# Patient Record
Sex: FEMALE | Race: WHITE
[De-identification: names, ages, dates, MRNs, and addresses within clinical notes are randomized per-mention and may not be internally consistent; named-entity substitution may affect disease eponyms.]

---

## 2019-02-18 ENCOUNTER — HOSPITAL ENCOUNTER (INPATIENT)
Dept: HOSPITAL 80 - FED | Age: 34
LOS: 8 days | Discharge: HOME | DRG: 477 | End: 2019-02-26
Attending: INTERNAL MEDICINE | Admitting: INTERNAL MEDICINE
Payer: COMMERCIAL

## 2019-02-18 DIAGNOSIS — L03.312: ICD-10-CM

## 2019-02-18 DIAGNOSIS — M60.88: ICD-10-CM

## 2019-02-18 DIAGNOSIS — M46.47: ICD-10-CM

## 2019-02-18 DIAGNOSIS — M46.27: ICD-10-CM

## 2019-02-18 DIAGNOSIS — D50.9: ICD-10-CM

## 2019-02-18 DIAGNOSIS — M62.830: ICD-10-CM

## 2019-02-18 DIAGNOSIS — G06.1: ICD-10-CM

## 2019-02-18 DIAGNOSIS — M46.46: ICD-10-CM

## 2019-02-18 DIAGNOSIS — M46.26: Primary | ICD-10-CM

## 2019-02-18 LAB — PLATELET # BLD: 494 10^3/UL (ref 150–400)

## 2019-02-18 PROCEDURE — A9585 GADOBUTROL INJECTION: HCPCS

## 2019-02-18 PROCEDURE — 0QB03ZX EXCISION OF LUMBAR VERTEBRA, PERCUTANEOUS APPROACH, DIAGNOSTIC: ICD-10-PCS | Performed by: GENERAL ACUTE CARE HOSPITAL

## 2019-02-18 PROCEDURE — C1751 CATH, INF, PER/CENT/MIDLINE: HCPCS

## 2019-02-18 PROCEDURE — 0SB03ZX EXCISION OF LUMBAR VERTEBRAL JOINT, PERCUTANEOUS APPROACH, DIAGNOSTIC: ICD-10-PCS | Performed by: GENERAL ACUTE CARE HOSPITAL

## 2019-02-18 PROCEDURE — G0378 HOSPITAL OBSERVATION PER HR: HCPCS

## 2019-02-18 RX ADMIN — KETOROLAC TROMETHAMINE PRN MG: 15 INJECTION, SOLUTION INTRAMUSCULAR; INTRAVENOUS at 20:09

## 2019-02-18 RX ADMIN — OXYCODONE HYDROCHLORIDE AND ACETAMINOPHEN PRN TAB: 5; 325 TABLET ORAL at 17:27

## 2019-02-18 RX ADMIN — CYCLOBENZAPRINE HYDROCHLORIDE PRN MG: 10 TABLET, FILM COATED ORAL at 17:59

## 2019-02-18 RX ADMIN — HYDROMORPHONE HYDROCHLORIDE PRN MG: 1 INJECTION, SOLUTION INTRAMUSCULAR; INTRAVENOUS; SUBCUTANEOUS at 18:00

## 2019-02-18 NOTE — GCON
[f rep st]



                                                                    CONSULTATION





INPATIENT INFECTIOUS DISEASE CONSULTATION



REFERRING PHYSICIAN:  Eran Carney MD



REASON FOR REFERRAL:  Probable lumbar postoperative infection.



HISTORY OF PRESENT ILLNESS:  Patient is a 33-year-old female who presented to the emergency room on 0
2/18/2019 with complaints of back pain with chills and sweats.  Patient has a history of an L4-S1 dis
kectomy in [place], [place] in November of last year.  Patient suffered as a complication of dur
al leak that needed to be corrected on 12/27/2018.  This surgery occurred at [facility] in [facility].  This spinal leak was corrected; however, the patient has been having increase in lumbar back pa
in and spasms since then.  She had feelings of sweats and chills over the last number of days as well
.  She came to the emergency room today and was evaluated with a lumbar spine MRI with contrast.  The
re shows significant L4-L5 as well as L5-S1 disk edema with enhancement as well as phlegmon that enha
nces epidurally.  There is no clear drainable abscess.  We are consulted to help guide antibiotic the
rapy as well as diagnosis.  Patient is comfortably resting in her hospital bed.  She is awaiting an I
nterventional Radiology aspiration of the area.  Other than pain and discomfort in her back, she has 
no acute complaints.



PAST MEDICAL HISTORY:  Asthma.



PAST SURGICAL HISTORY:  As above.



ANTIBIOTICS:  None currently.



ALLERGIES:  Patient only notes seasonal allergies.



SOCIAL HISTORY:  Patient recently moved to [place] from [place], [place].  No significant tobacc
o, alcohol, or drug use noted.



FAMILY HISTORY:  Reviewed but noncontributory.



REVIEW OF SYSTEMS:  Other than that detailed above in history of present illness, comprehensive 10-sy
stem review is negative.



PHYSICAL EXAMINATION:  VITAL SIGNS:  Temperature maximum is 36.9, temperature current is 36.7, heart 
rate is 75, respiratory rate is 16, blood pressure is 118/86.  GENERAL:  The patient is a well-formed
, well-nourished female in no acute distress.  She is not toxic in appearance.  She is alert and orie
nted x3.  She is pleasant in demeanor.  HEENT:  Normocephalic for age.  Atraumatic.  No scleral icter
us.  No drainage from the nares.  Eyes, lids and conjunctivae are within normal limits.  Pupils are e
qual and round bilaterally.  NECK:  Supple.  No meningismus.  HEART:  Regular rate and rhythm.  No si
gnificant peripheral edema.  SKIN:  Warm and dry to the touch.  No rash or lesion noted.  NEURO:  Cra
nial nerves 2-12 seem to be intact.  Peripheral sensation seems intact in extremities.



LABORATORY DATA:  Patient has a CBC dated 02/18/2019 that shows a white blood cell count of 12.02, he
moglobin of 12.1, hematocrit of 38.2, and a platelet count of 494.  Differential is within normal hines
its.  Serum chemistries show sodium 140, potassium 3.8, chloride of 103, bicarbonate of 23, BUN of 7,
 and creatinine of 0.6.  C-reactive protein is elevated at 16.2.



MICROBIOLOGIC DATA:  Patient has blood cultures dated 02/18/2019 which are pending.



RADIOLOGIC DATA:  Patient has a lumbar spine MRI dated 02/18/2019, which shows L4-L5 as well as L5-S1
 disk edema with enhancement as well as adjacent vertebral body edema enhancement with a right L4-L5 
and L5-S1 ventral epidural enhancing tissue suspicious for diskitis as well as vertebral osteomyeliti
s.  She also has an enhancing ventral epidural phlegmon.  There are no drainable abscesses seen.



ASSESSMENT:  Probable postoperative infection in the lower lumbar area following diskectomy as well a
s dural leak repair.  We will get an aspiration of the soft tissue for culture and afterwards empiric
ally start vancomycin 1.25 g IV q.12 hours.  We will follow up on the results of the aspiration and f
ollow up on vancomycin levels after 3 doses.



PLAN:  

1.  Aspiration of the disk space in the affected lumbar areas.

2.  Empiric start of vancomycin 1.25 g q.12 hours following sampling.

3.  Follow up on results and clinical course.





Job #:  [id number]

## 2019-02-18 NOTE — EDPHY
H & P


Time Seen by Provider: 02/18/19 10:33


Constitutional: 


 Initial Vital Signs











Temperature (C)  36.7 C   02/18/19 10:42


 


Heart Rate  77   02/18/19 10:42


 


Respiratory Rate  18   02/18/19 10:42


 


Blood Pressure  125/88 H  02/18/19 10:42


 


O2 Sat (%)  97   02/18/19 10:42








 











O2 Delivery Mode               Nasal Cannula


 


O2 (L/minute)                  2














Allergies/Adverse Reactions: 


 





No Known Allergies Allergy (Verified 02/18/19 14:08)


 








Home Medications: 














 Medication  Instructions  Recorded


 


Advil  02/18/19


 


Flexeril 10 MG (*)  02/18/19


 


Ibuprofen [Motrin (*)] 600 mg PO DAILY18 02/18/19


 


Naproxen Sodium [Aleve 220 MG (*)] 220 mg PO BID PRN 02/18/19


 


Valium 2 MG (*)  02/18/19














Medical Decision Making





- Diagnostics


Imaging Results: 


 Imaging Impressions





Lumbar Spine MRI  02/18/19 10:54


Impression:


1. L4-L5 and L5-S1 disk edema with enhancement as well as adjacent vertebral 

body edema and enhancement with right L4-L5 and L5-S1 ventral epidural 

enhancing tissue suspicious for diskitis, osteomyelitis, enhancing ventral 

epidural phlegmon versus less likely severe postsurgical changes with enhancing 

postsurgical gliosis/scarring.


2. No drainable abscess.


3. Please see above findings at specific levels.


 


Findings and recommendations discussed with Emergency Department physician, 

Eran Carney MD, at 1250 hour, 2/18/2019.


 


Final report concurs with initial preliminary interpretation. 











Imaging: Discussed imaging studies w/ On call Radiologist, I viewed and 

interpreted images myself


ED Course/Re-evaluation: 





CHIEF COMPLAINT:  Back pain





HISTORY OF PRESENT ILLNESS:  


The patient is a 34 y/o female with a history of L4-S1 discectomy and dura leak 

arriving via EMS complaining of acute onset back pain. On 11/27/18 she had an L4

-L5/L5-S1 discectomy in [place]; she did not have a fusion. On 12/27/18 she had 

surgery to correct a dura leak at [facility] with Dr. Chavarria in [place]. 

This resolved the spinal leak and she did not have any acute symptoms after the 

surgery. Two weeks ago while working out on a [employer] bicycle she developed a 

similar episode of back pain and spasms. She went back to [facility] for the 

spasm, had an MRI, and was thought that the symptoms were due to and SI joint 

problem due to a lateral shift from the herniated discs. Several days ago she 

went to PT and had more extensive manipulation than normal. She was sore after 

the PT visit but was able to walk without difficulty. After going to her 

chiropractor today she was getting into a truck when her back started severely 

spasm today. She took an unknown amount of Valium at 09:45, 1 hour ago, which 

did not alleviate the symptoms. EMS was called and they gave 100mcg IM Fentanyl 

which did not help her symptoms. Currently the pain feels like it is radiating 

up her back and not down her legs. Due to the pain she is having difficulty 

walking. She denies recent injections or instrumentations in her back since her 

last surgery. No fever, headache, body aches, lightheadedness, chest pain, 

heart palpitations, shortness of breath, cough, abdominal pain, urinary or 

bowel complaints.





REVIEW OF SYSTEMS:  





A comprehensive 10 system review of systems is otherwise negative aside from 

elements mentioned in the history of present illness and medical decision 

making.





PHYSICAL EXAM:  





HR, BP, O2 Sat, RR.  Temp noted


General Appearance:  Alert, well hydrated, appropriate, and non-toxic appearing.


Head:  Atraumatic without scalp tenderness or obvious injury


Eyes:  Pupils equal, round, reactive to light and accommodation, EOMI, no trauma

, no injection.


Ears:  Clear bilaterally, no perforation, normal landmarks


Nose:  Atraumatic, no rhinorrhea, clear.


Throat:  There is no erythema or exudates, no lesions, normal tonsils, mucus 

membranes moist.


Neck:  Supple, 2+ carotid upstroke, nontender, no lymphadenopathy.


Respiratory:  No retractions, no distress, no wheezes, and no accessory muscle 

use.  Lungs are clear to auscultation bilaterally.


Cardiovascular:  Regular rate and rhythm, no murmurs, rubs, or gallops. 

Bilateral carotid, radial, dorsalis pedis, and posterior tibial pulses intact. 

Good capillary refill all extremities.


Gastrointestinal:  Abdomen is soft, nontender, non-distended, no masses, no 

rebound, no guarding, no peritoneal signs.


Back: non-intractable low back pain radiating up. She does not have any 

radiculopathy.


Musculoskeletal:  Normal active ROM of all extremities, atraumatic.


Neurological:  Alert, appropriate, and interactive.  The patient has normal 

DTRs and non-focal cranial nerves, motor, sensory, and cerebellar exam.


Skin:  No rashes, good turgor, no nodules on palpation.





Past medical history: Asthma


Past surgical history:  L4-S1 discectomy (11/27/18)  and dura leak repair (12/27 /18)


Family history: Denies


Social history: Family at [facility], recently moved to [place] from [place], employed





DIAGNOSTICS/PROCEDURES/CRITICAL CARE TIME:  





Lumbar spine MRI:  L4-L5 and L5-S1 disk edema with enhancement as well as 

adjacent vertebral body edema and enhancement with right L4-L5 and L5-S1 

ventral epidural enhancing tissue suspicious for diskitis, osteomyelitis, 

enhancing ventral epidural phlegmon versus less likely severe postsurgical 

changes with enhancing postsurgical gliosis/scarring. No drainable abscess.





Critical care time spent by me, Dr. Carney, exclusively with this patient was 

90 minutes, exclusive of PA time and exclusive of procedures.  The organ system 

at risk was neurovascular and I gave IV meds, antibiotics and transferred the 

patient to a neurosurgeon to prevent  worsening of the patients condition.








DIFFERENTIAL DIAGNOSIS:   


The differential diagnosis for the patient's back pain included but was not 

limited to musculoskeletal pain, epidural abscess, herniated disk, spinal 

fracture, and intra-abdominal causes including urinary system.





MEDICAL DECISION MAKING:  


The patient is a 34 y/o female with a history of L4-S1 discectomy (11/27/18)  

and dura leak repair (12/27/18) arriving via EMS presenting with acute onset 

back pain. On exam she has non-intractable low back pain radiating up. She does 

not have any radiculopathy. Lumbar spine MRI and labs ordered; 1mg IV Dilaudid, 

1mg IV Ativan, and 30mg IV Toradol administered.





1034: I met EMS upon arrival.





1254: I spoke with Dr. Malcolm, radiologist, regarding patient's lumbar spine 

MRI. This patient might have a discitis and will need to have a neurology 

consult. Additional labs ordered.





1259: I consulted with Dr. Wilkerson, neurosurgeon, regarding this patient. His 

practice will come into the emergency department to consult on this patient.





1300: Reassessed patient and discussed imaging findings. I have also discussed 

plan for neurosurgeon consult, which she is comfortable with.





1325: I consulted with Dr. Teresa neurosurgeon, regarding this patient as he is 

in the emergency department. Patient does have a mildly elevated WBC. 

Additional labs ordered.





1339: Dr. Grimm believes that this patient has an infection and will need an I&

D consult, which I have paged. This patient is also now endorsing chills. The 

patient is comfortable with plan for admission and possible surgery.





1341: I consulted with the hospitalist service, Dr. Herr accepts admission 

of this patient. Patient will receive and additional 1mg IV Dilaudid.





1357: I consulted with Dr. Mauricio, ID, regarding this patient. I will also 

consult with interventional radiology to discuss if we can express and purulent 

from the area of infection. Patient will need to have vancomycin administered 

after the phlegmon cultures are collected.





1405: I consulted with Dr. De Los Santos, interventional radiologist, regarding this 

patient. He will aspirate the lumbar phlegmon.





1407: Reassessed patient and discussed consult with ID and IR. Patient is 

comfortable with plan for IR phlegmon aspiration.





1446: I consulted with Dr. Santo, ID, as he is now in the emergency department. 

Patient is still awaiting phlegmon aspiration at shift change.





1500: Patient is waiting to be transferred to the floor at shift change. We are 

waiting to give patient the vancomycin until after the IR procedure.








- Data Points


Laboratory Results: 


 Laboratory Results





 02/18/19 12:45 





 02/18/19 10:46 





 











  02/18/19 02/18/19 02/18/19





  12:45 12:45 11:03


 


WBC      





    


 


RBC      





    


 


Hgb      





    


 


POC Hgb      12.9 gm/dL gm/dL





     (12.6-16.3) 


 


Hct  38.9 % %    





   (38.0-47.0)   


 


POC Hct      38 % %





     (38-47) 


 


MCV      





    


 


MCH      





    


 


MCHC      





    


 


RDW      





    


 


Plt Count      





    


 


MPV      





    


 


Neut % (Auto)      





    


 


Lymph % (Auto)      





    


 


Mono % (Auto)      





    


 


Eos % (Auto)      





    


 


Baso % (Auto)      





    


 


Nucleat RBC Rel Count      





    


 


Absolute Neuts (auto)      





    


 


Absolute Lymphs (auto)      





    


 


Absolute Monos (auto)      





    


 


Absolute Eos (auto)      





    


 


Absolute Basos (auto)      





    


 


Absolute Nucleated RBC      





    


 


Immature Gran %      





    


 


Immature Gran #      





    


 


ESR  20 MM/HR MM/HR    





   (0-20)   


 


POC Sodium      140 mEq/L mEq/L





     (135-145) 


 


Sodium      





    


 


POC Potassium      3.8 mEq/L mEq/L





     (3.3-5.0) 


 


Potassium      





    


 


POC Chloride      103 mEq/L mEq/L





     () 


 


Chloride      





    


 


Carbon Dioxide      





    


 


POC Total CO2      23 mEq/L mEq/L





     (22-31) 


 


Anion Gap      





    


 


POC BUN      7 mg/dL mg/dL





     (7-23) 


 


BUN      





    


 


Creatinine      





    


 


POC Creatinine      0.6 mg/dL mg/dL





     (0.6-1.0) 


 


Estimated GFR      





    


 


Glucose      





    


 


POC Glucose      85 mg/dL mg/dL





     () 


 


Calcium      





    


 


C-Reactive Protein    16.2 mg/L H mg/L  





    (<10.0)  














  02/18/19 02/18/19





  10:46 10:46


 


WBC    12.02 10^3/uL H 10^3/uL





    (3.80-9.50) 


 


RBC    4.17 10^6/uL L 10^6/uL





    (4.18-5.33) 


 


Hgb    12.1 g/dL L g/dL





    (12.6-16.3) 


 


POC Hgb    





   


 


Hct    38.2 % %





    (38.0-47.0) 


 


POC Hct    





   


 


MCV    91.6 fL fL





    (81.5-99.8) 


 


MCH    29.0 pg pg





    (27.9-34.1) 


 


MCHC    31.7 g/dL L g/dL





    (32.4-36.7) 


 


RDW    12.8 % %





    (11.5-15.2) 


 


Plt Count    494 10^3/uL H 10^3/uL





    (150-400) 


 


MPV    9.2 fL fL





    (8.7-11.7) 


 


Neut % (Auto)    73.6 % %





    (39.3-74.2) 


 


Lymph % (Auto)    19.2 % %





    (15.0-45.0) 


 


Mono % (Auto)    4.1 % L %





    (4.5-13.0) 


 


Eos % (Auto)    2.1 % %





    (0.6-7.6) 


 


Baso % (Auto)    0.6 % %





    (0.3-1.7) 


 


Nucleat RBC Rel Count    0.0 % %





    (0.0-0.2) 


 


Absolute Neuts (auto)    8.85 10^3/uL H 10^3/uL





    (1.70-6.50) 


 


Absolute Lymphs (auto)    2.31 10^3/uL 10^3/uL





    (1.00-3.00) 


 


Absolute Monos (auto)    0.49 10^3/uL 10^3/uL





    (0.30-0.80) 


 


Absolute Eos (auto)    0.25 10^3/uL 10^3/uL





    (0.03-0.40) 


 


Absolute Basos (auto)    0.07 10^3/uL 10^3/uL





    (0.02-0.10) 


 


Absolute Nucleated RBC    0.00 10^3/uL 10^3/uL





    (0-0.01) 


 


Immature Gran %    0.4 % %





    (0.0-1.1) 


 


Immature Gran #    0.05 10^3/uL 10^3/uL





    (0.00-0.10) 


 


ESR    





   


 


POC Sodium    





   


 


Sodium  138 mEq/L mEq/L  





   (135-145)  


 


POC Potassium    





   


 


Potassium  4.2 mEq/L mEq/L  





   (3.5-5.2)  


 


POC Chloride    





   


 


Chloride  106 mEq/L mEq/L  





   ()  


 


Carbon Dioxide  22 mEq/l mEq/l  





   (22-31)  


 


POC Total CO2    





   


 


Anion Gap  10 mEq/L mEq/L  





   (6-14)  


 


POC BUN    





   


 


BUN  10 mg/dL mg/dL  





   (7-23)  


 


Creatinine  0.6 mg/dL mg/dL  





   (0.6-1.0)  


 


POC Creatinine    





   


 


Estimated GFR  > 60   





   


 


Glucose  84 mg/dL mg/dL  





   ()  


 


POC Glucose    





   


 


Calcium  9.6 mg/dL mg/dL  





   (8.5-10.4)  


 


C-Reactive Protein    





   











Medications Given: 


 








Discontinued Medications





Hydromorphone HCl (Dilaudid)  1 mg IVP EDNOW ONE


   Stop: 02/18/19 10:55


   Last Admin: 02/18/19 11:21 Dose:  1 mg


Hydromorphone HCl (Dilaudid)  1 mg IVP EDNOW ONE


   Stop: 02/18/19 13:52


   Last Admin: 02/18/19 13:55 Dose:  1 mg


Ketorolac Tromethamine (Toradol)  30 mg IVP EDNOW ONE


   Stop: 02/18/19 10:55


   Last Admin: 02/18/19 11:20 Dose:  30 mg


Lorazepam (Ativan Injection)  1 mg IVP EDNOW ONE


   Stop: 02/18/19 10:55


   Last Admin: 02/18/19 11:20 Dose:  1 mg





Point of Care Test Results: 


 Chemistry











  02/18/19





  11:03


 


POC Sodium  140 mEq/L mEq/L





   (135-145) 


 


POC Potassium  3.8 mEq/L mEq/L





   (3.3-5.0) 


 


POC Chloride  103 mEq/L mEq/L





   () 


 


POC Total CO2  23 mEq/L mEq/L





   (22-31) 


 


POC BUN  7 mg/dL mg/dL





   (7-23) 


 


POC Creatinine  0.6 mg/dL mg/dL





   (0.6-1.0) 


 


POC Glucose  85 mg/dL mg/dL





   () 








 ISTAT H&H











  02/18/19





  11:03


 


POC Hgb  12.9 gm/dL gm/dL





   (12.6-16.3) 


 


POC Hct  38 % %





   (38-47) 














Departure





- Departure


Disposition: [facility] Inpatient Acute


Clinical Impression: 


 Phlegmon





Discitis


Qualifiers:


 Spinal region: lumbosacral Qualified Code(s): M46.47 - Discitis, unspecified, 

lumbosacral region





Condition: Good


Referrals: 


Patient,NotPresent [Unknown] - As per Instructions


Report Scribed for: Eran Carney


Report Scribed by: Maria A Berrios


Date of Report: 02/18/19


Time of Report: 11:08

## 2019-02-18 NOTE — PDGENHP
History and Physical





- Chief Complaint


back pain





- History of Present Illness


32yo F with history of L4-5/L5-S1 herniation s/p discectomy (11/27/2018 in 

[place]) complicated by dural leak s/p patch (12/27/2018 at [facility]) presents 

with acute onset back pain. First noticed some mild low back pain about 2 weeks 

ago after exercising and while working with PT. Went back to [facility] and had 

MRI at that time that was reportedly unremarkable. She was about to go to her 

chiropractor today when she developed very severe mid-line low back pain 

associated with L>R leg weakness and decided to come to the ED. She reports 1-2 

days of chills but no fevers. No loss of bowel/bladder continence. In the ED, 

lumbar MRI is concerning for discitis/osteomyelitis with possible phlegmon at L4

-5/L5-S1. Neurosurgery, IR, and ID were all consulted in the ED.





History Information





- Allergies/Home Medication List


Allergies/Adverse Reactions: 








No Known Allergies Allergy (Verified 02/18/19 14:08)


 





Home Medications: 








Cyclobenzaprine [Flexeril 10 MG (*)] 5 mg PO BID 02/18/19 [Last Taken 02/18/19 

08:30]


Diazepam [Valium 2 MG (*)] 2 mg PO TID PRN 02/18/19 [Last Taken 02/18/19 09:30]


Ibuprofen [Motrin (*)] 400 mg PO BID@09,12 02/18/19 [Last Taken 02/18/19 09:00]


Ibuprofen [Motrin (*)] 600 mg PO DAILY18 02/18/19 [Last Taken 02/17/19]


Naproxen Sodium [Aleve 220 MG (*)] 220 mg PO BID PRN 02/18/19 [Last Taken 

Unknown]





I have personally reviewed and updated: family history, medical history, social 

history, surgical history





- Past Medical History


Additional medical history: as per HPI





- Surgical History


Additional surgical history: as per HPI





- Family History


Additional family history: father - currently undergoing spinal decompression 

surgery





- Social History


Smoking Status: Never smoked


Alcohol Use: Rarely


Drug Use: None


Additional social history: Lives with 





Review of Systems


Review of Systems: 





ROS: 10pt was reviewed & negative except for what was stated in HPI & below





Physical Exam


Physical Exam: 

















Temp Pulse Resp BP Pulse Ox


 


 36.7 C   79   16   118/68   98 


 


 02/18/19 13:00  02/18/19 13:00  02/18/19 13:00  02/18/19 13:00  02/18/19 13:00











Constitutional: no apparent distress, appears nourished, not in pain, other (

lying flat on back)


Eyes: PERRL, anicteric sclera, EOMI


Ears, Nose, Mouth, Throat: moist mucous membranes, hearing normal, ears appear 

normal, no oral mucosal ulcers


Cardiovascular: regular rate and rhythym, no murmur, rub, or gallop, No edema


Respiratory: no respiratory distress, no rales or rhonchi, clear to auscultation


Gastrointestinal: normoactive bowel sounds, soft, non-tender abdomen, no 

palpable masses


Genitourinary: no bladder fullness, no bladder tenderness


Skin: warm, normal color, no rashes or abrasions, no fluctuance, no induration, 

No mottled


Musculoskeletal: other (difficult to assess strength in BLE due to pain)


Neurologic: AAOx3, sensation intact bilaterally, CN II-XII Intact, other (on 

ankle clonus or hyperreflexia at knees)


Psychiatric: interacting appropriately





Lab Data & Imaging Review





 02/18/19 12:45





 02/18/19 10:46














WBC  12.02 10^3/uL (3.80-9.50)  H  02/18/19  10:46    


 


RBC  4.17 10^6/uL (4.18-5.33)  L  02/18/19  10:46    


 


Hgb  12.1 g/dL (12.6-16.3)  L  02/18/19  10:46    


 


POC Hgb  12.9 gm/dL (12.6-16.3)   02/18/19  11:03    


 


Hct  38.9 % (38.0-47.0)   02/18/19  12:45    


 


POC Hct  38 % (38-47)   02/18/19  11:03    


 


MCV  91.6 fL (81.5-99.8)   02/18/19  10:46    


 


MCH  29.0 pg (27.9-34.1)   02/18/19  10:46    


 


MCHC  31.7 g/dL (32.4-36.7)  L  02/18/19  10:46    


 


RDW  12.8 % (11.5-15.2)   02/18/19  10:46    


 


Plt Count  494 10^3/uL (150-400)  H  02/18/19  10:46    


 


MPV  9.2 fL (8.7-11.7)   02/18/19  10:46    


 


Neut % (Auto)  73.6 % (39.3-74.2)   02/18/19  10:46    


 


Lymph % (Auto)  19.2 % (15.0-45.0)   02/18/19  10:46    


 


Mono % (Auto)  4.1 % (4.5-13.0)  L  02/18/19  10:46    


 


Eos % (Auto)  2.1 % (0.6-7.6)   02/18/19  10:46    


 


Baso % (Auto)  0.6 % (0.3-1.7)   02/18/19  10:46    


 


Nucleat RBC Rel Count  0.0 % (0.0-0.2)   02/18/19  10:46    


 


Absolute Neuts (auto)  8.85 10^3/uL (1.70-6.50)  H  02/18/19  10:46    


 


Absolute Lymphs (auto)  2.31 10^3/uL (1.00-3.00)   02/18/19  10:46    


 


Absolute Monos (auto)  0.49 10^3/uL (0.30-0.80)   02/18/19  10:46    


 


Absolute Eos (auto)  0.25 10^3/uL (0.03-0.40)   02/18/19  10:46    


 


Absolute Basos (auto)  0.07 10^3/uL (0.02-0.10)   02/18/19  10:46    


 


Absolute Nucleated RBC  0.00 10^3/uL (0-0.01)   02/18/19  10:46    


 


Immature Gran %  0.4 % (0.0-1.1)   02/18/19  10:46    


 


Immature Gran #  0.05 10^3/uL (0.00-0.10)   02/18/19  10:46    


 


ESR  20 MM/HR (0-20)   02/18/19  12:45    


 


POC Sodium  140 mEq/L (135-145)   02/18/19  11:03    


 


Sodium  138 mEq/L (135-145)   02/18/19  10:46    


 


POC Potassium  3.8 mEq/L (3.3-5.0)   02/18/19  11:03    


 


Potassium  4.2 mEq/L (3.5-5.2)   02/18/19  10:46    


 


POC Chloride  103 mEq/L ()   02/18/19  11:03    


 


Chloride  106 mEq/L ()   02/18/19  10:46    


 


Carbon Dioxide  22 mEq/l (22-31)   02/18/19  10:46    


 


POC Total CO2  23 mEq/L (22-31)   02/18/19  11:03    


 


Anion Gap  10 mEq/L (6-14)   02/18/19  10:46    


 


POC BUN  7 mg/dL (7-23)   02/18/19  11:03    


 


BUN  10 mg/dL (7-23)   02/18/19  10:46    


 


Creatinine  0.6 mg/dL (0.6-1.0)   02/18/19  10:46    


 


POC Creatinine  0.6 mg/dL (0.6-1.0)   02/18/19  11:03    


 


Estimated GFR  > 60   02/18/19  10:46    


 


Glucose  84 mg/dL ()   02/18/19  10:46    


 


POC Glucose  85 mg/dL ()   02/18/19  11:03    


 


Calcium  9.6 mg/dL (8.5-10.4)   02/18/19  10:46    


 


C-Reactive Protein  16.2 mg/L (<10.0)  H  02/18/19  12:45    











Assessment & Plan


Assessment: 


32yo F with history of L4-5/L5-S1 herniation s/p discectomy (11/27/2018 in 

[place]) complicated by dural leak s/p patch (12/27/2018 at [facility]) presents 

with acute onset back pain. MRI concerning for discitis. 


Plan: 





1. Concern for L4-5/L5-S1 discitis and phlegmon: She is not septic/toxic 

appearing. 


   - IR consulted for phlegmon aspiration. Send for gram stain/culture of 

aspirated material


   - ID consulted. Will likely need IV vancomycin once aspiration complete


   - Blood cultures ordered


   - Neurosurgery consulted for potential need for debridement/washout


   - Will make oxycodone, IV dilaudid and ativan available PRN





2. Anemia: Normocytic. Suspect related to chronic inflammation. 


   - Check iron stores.





VTE ppx: SCDs


Code: full


Diet: NPO for now


Dispo: Admit under observation

## 2019-02-18 NOTE — GCON
[f rep st]



                                                                    CONSULTATION





NEUROSURGICAL CONSULTATION



DATE OF CONSULTATION:  02/18/2019



REASON FOR CONSULTATION:  Severe axial low back pain, possible infection.



HISTORY OF PRESENT ILLNESS:  The patient is a 33-year-old who underwent a 2-level lumbar laminectomy 
in the [employer] [facility] [employer] back in November, I believe on November 27th, complicated by delayed CSF leak, and
 she underwent subsequent surgery 1 month later for repair of the CSF leak at [facility].  At the
 time, she was having transdermal leakage of CSF out of the incision, and she was repaired by my part
ner, Dr. Donis Small, without complication.  A few weeks ago, she began to develop increasing onset 
of low back pain and had an MRI over at [facility] that demonstrated postoperative harkins
es, but then today she began to develop worsening, very severe pain in the lower back as she was abou
t to go to the chiropractor.  She was having no radiating pain into her legs, no weakness in her legs
, no numbness or tingling in the legs, just exquisitely horrible low back pain.  On further questioni
ng, she does mention that she has been having some chills, particularly nighttime chills over the las
t several days, and this was a new development for her that she was not having back in December when 
she had the repair.  She denies any actual fevers.



ALLERGIES:  None.



PAST MEDICAL HISTORY:  Includes simply low back surgery complicated by CSF leak.



SOCIAL HISTORY:  She does not smoke.  She almost never uses alcohol.  She does live with her .
  They have been staying locally because of the problems in her low back.  She does have family membe
rs here in town locally.



PHYSICAL EXAM:  Her temperature is 36.7, pulse 79, respirations 16, blood pressure 118/60, pulse ox i
s 98%.  She has normal strength in her tibialis anterior, plantar flexors and normal sensation in her
 legs.  She had a positive straight leg raise on the right.  It caused exquisitely terrible right-audi
ed low back pain.  She had more tolerance for movement of the left leg on physical exam.  Her incisio
n demonstrates diffuse erythema, but did not appear to be frankly infected.  There is no drainage fro
m the surgical site.  



MRI of the brain demonstrates diffuse contrast enhancement of the L4, L5, and S1 vertebral bodies and
 disk spaces as well as epidural space in the region consistent with possible infection.



DIAGNOSTIC REVIEW:  Her white blood cell count was noted to be 12.0 with 73.6% neutrophils.  The uppe
r limit of normal in this hospital is 9.5 for white blood cell count.  Her sed rate is 20, which is a
t the upper range of what is considered normal in this hospital.  Her C-reactive protein is 16.2, whi
ch was elevated above the normal range of less than 10.



ASSESSMENT:  The patient is a 33-year-old with really exquisite low back pain and imaging characteris
tics consistent with possible postoperative diskitis, but no evidence of true epidural abscess in thi
s case.  She has no radiating pain into her legs.  She will be admitted to the medical service and un
dergo blood cultures.  We have asked Infectious Disease to opine.  I do not see a reason to currently
 consider operative debridement of the area in the absence of neurologic findings, although we will w
ait blood cultures to see the results of these tests.  We also are deferential to our colleagues in I
nfectious Disease.  She will be admitted to the hospital and, while I saw the patient for my partner,
 Dr. Donis Small, he will likely assume her care after admission.





Job #:  [id number]

## 2019-02-18 NOTE — PDRADPN
Radiology Procedure Note


Date of Procedure: 02/18/19


Radiologist: Claudia Kendrick


Anesthesia: IV Sedation


Pre-op Diagnosis: OM + diskitis


Post-op Diagnosis: same


Procedure: L5 bone + L4-L5 disk biopsy


Inf/Abcess present in the surg proc area at time of surgery?: No

## 2019-02-19 LAB — PLATELET # BLD: 350 10^3/UL (ref 150–400)

## 2019-02-19 RX ADMIN — OXYCODONE HYDROCHLORIDE PRN MG: 15 TABLET ORAL at 17:04

## 2019-02-19 RX ADMIN — OXYCODONE HYDROCHLORIDE PRN MG: 15 TABLET ORAL at 12:42

## 2019-02-19 RX ADMIN — HYDROMORPHONE HYDROCHLORIDE PRN MG: 1 INJECTION, SOLUTION INTRAMUSCULAR; INTRAVENOUS; SUBCUTANEOUS at 03:02

## 2019-02-19 RX ADMIN — ENOXAPARIN SODIUM SCH MG: 100 INJECTION SUBCUTANEOUS at 10:36

## 2019-02-19 RX ADMIN — METHOCARBAMOL SCH MG: 750 TABLET ORAL at 07:30

## 2019-02-19 RX ADMIN — ACETAMINOPHEN PRN MG: 325 TABLET ORAL at 17:02

## 2019-02-19 RX ADMIN — KETOROLAC TROMETHAMINE PRN MG: 15 INJECTION, SOLUTION INTRAMUSCULAR; INTRAVENOUS at 17:05

## 2019-02-19 RX ADMIN — DOCUSATE SODIUM AND SENNOSIDES SCH TAB: 50; 8.6 TABLET ORAL at 10:36

## 2019-02-19 RX ADMIN — OXYCODONE HYDROCHLORIDE PRN MG: 15 TABLET ORAL at 22:34

## 2019-02-19 RX ADMIN — OXYCODONE HYDROCHLORIDE AND ACETAMINOPHEN PRN TAB: 5; 325 TABLET ORAL at 07:28

## 2019-02-19 RX ADMIN — METHOCARBAMOL SCH MG: 750 TABLET ORAL at 20:22

## 2019-02-19 RX ADMIN — HYDROMORPHONE HYDROCHLORIDE PRN MG: 1 INJECTION, SOLUTION INTRAMUSCULAR; INTRAVENOUS; SUBCUTANEOUS at 10:39

## 2019-02-19 RX ADMIN — HYDROMORPHONE HYDROCHLORIDE PRN MG: 1 INJECTION, SOLUTION INTRAMUSCULAR; INTRAVENOUS; SUBCUTANEOUS at 06:24

## 2019-02-19 RX ADMIN — HYDROMORPHONE HYDROCHLORIDE PRN MG: 1 INJECTION, SOLUTION INTRAMUSCULAR; INTRAVENOUS; SUBCUTANEOUS at 23:51

## 2019-02-19 RX ADMIN — VANCOMYCIN HYDROCHLORIDE SCH MLS: 1 INJECTION, POWDER, LYOPHILIZED, FOR SOLUTION INTRAVENOUS at 17:07

## 2019-02-19 RX ADMIN — METHOCARBAMOL SCH MG: 750 TABLET ORAL at 12:02

## 2019-02-19 RX ADMIN — DOCUSATE SODIUM AND SENNOSIDES SCH TAB: 50; 8.6 TABLET ORAL at 20:22

## 2019-02-19 RX ADMIN — CYCLOBENZAPRINE HYDROCHLORIDE PRN MG: 10 TABLET, FILM COATED ORAL at 04:37

## 2019-02-19 RX ADMIN — KETOROLAC TROMETHAMINE PRN MG: 15 INJECTION, SOLUTION INTRAMUSCULAR; INTRAVENOUS at 02:14

## 2019-02-19 RX ADMIN — DEXTROSE MONOHYDRATE SCH MLS: 5 INJECTION, SOLUTION INTRAVENOUS at 22:40

## 2019-02-19 RX ADMIN — KETOROLAC TROMETHAMINE PRN MG: 15 INJECTION, SOLUTION INTRAMUSCULAR; INTRAVENOUS at 10:47

## 2019-02-19 RX ADMIN — DEXTROSE MONOHYDRATE SCH MLS: 5 INJECTION, SOLUTION INTRAVENOUS at 15:26

## 2019-02-19 RX ADMIN — METHOCARBAMOL SCH MG: 750 TABLET ORAL at 17:05

## 2019-02-19 NOTE — PCMIDPN
Assessment/Plan: 


Assessment/Plan:


* L4-5/L5-S1 diskitis/osteomyelitis with epidural phlegmon:  Gram stain of disk 

aspirate shows gram-negative rods.  Will add cefepime given gram stain finding 

while culture is pending.  Will continue vancomycin pending culture data with 

plans to discontinue if Gram-negative rods isolated.


* Left hip pain:  Agree with plans for MRI of hip to assess for any evidence of 

joint effusion which would be concerning for possibility of septic arthritis.  

Also possible patient could have psoas or iliopsoas abscess which also could 

contribute to significant left-sided hip pain.  Will review with Radiology to 

determine if psoas visualized on MRI of lumbar spine.  If joint effusion present

, will require aspiration and orthopedic evaluation.


* Heel pain:  Unclear etiology without focal findings on exam.  Continue to 

observe over time.





Care coordinated with Dr Small, pharmacy, and microbiology lab.  Clinical 

findings and plan were reviewed with patient and .





02/19/19 16:06





02/19/19 16:14





Subjective: 





Patient complains of left hip pain.  Able to bear weight but with significant 

pain.  Worse with range of motion.  Persistent low back pain.  Patient status 

post disc aspiration by interventional Radiology yesterday.


Objective: 


 Vital Signs











Temp Pulse Resp BP Pulse Ox


 


 36.8 C   81   16   111/74   96 


 


 02/19/19 15:23  02/19/19 15:23  02/19/19 15:23  02/19/19 15:23  02/19/19 15:23








 Microbiology











 02/18/19 16:35 Gram Stain - Final





 Back - Bone 








 Laboratory Results





 02/19/19 04:35 





 











 02/18/19 02/19/19 02/20/19





 05:59 05:59 05:59


 


Intake Total  1050 


 


Output Total  1300 800


 


Balance  -250 -800








 











ESR  20 MM/HR (0-20)   02/18/19  12:45    


 


C-Reactive Protein  16.2 mg/L (<10.0)  H  02/18/19  12:45    








Vancomycin 1 g x1


Disc aspirate no white blood cells, rare gram-negative rods


Blood cultures x2 no growth


MRI of back findings reviewed


 Laboratory Tests











  02/18/19





  12:45


 


C-Reactive Protein  16.2 H














- Physical Exam


General Appearance: alert, non-toxic


EENT: No scleral icterus, No thrush, No conjunctival petechiae


Respiratory: lungs clear, No respiratory distress


Cardiac/Chest: regular rate, rhythm, No systolic murmur


Extremities: other (Tender over left hip joint and exquisitely tender with 

range of motion; bilateral heels nontender to palpation without erythema)


Abdomen: non-tender, No distended


Back: other (Exam deferred due to discomfort)


Skin: No rash


Neuro/Psych: No motor weakness, No confused





- Time Spent With Patient


Time Spent with Patient: greater than 35 minutes


Time Spent with Patient: Greater than 35 minutes spent on this patients care, 

greater than 50% of time spent counseling, educating, and coordinating care 

regarding the above mentioned plan.





ICD10 Worksheet


Patient Problems: 


 Problems











Problem Status Onset


 


Discitis Acute  


 


Phlegmon Acute

## 2019-02-19 NOTE — NEUSURGPN
Assessment/Plan: 


Assessment: 33 yr old s/p L4-5, L5-S1 lami in [place], then dural repair on 12/27 /18





Plan:


-Patient admitted with increasing back pain, MRI shows diffuse contrast 

enhancement throughout the surgical areas of L4-5, L5-S1 and the disks and 

vertebral bodies at L4-5, L5-S1


-ID following, we appreciate their help with management. Will defer antbx 

treatment to ID  


-Blood cultures and IR biopsy results pending 


-DVT ppx SCDs, TEDs. Ok for Lovenox from our standpoint. Will defer to Medicine


-Added Robaxin scheduled for left hip spasms, changed Percocet to Oxycodone


-No surgical intervention indicated at this time.  


Discussed patient with Dr Small


Please call neurosurgery with questions/concerns 





Subjective: 


left hip spasms 


Objective: 


AxO x3


PERRLA/EOMI


MAEx4


5/5 BUE, BLE


Sensation intact to light touch BLE 


Neuro Check Frequency: per routine 


Urinary Catheter in Place: No





- Physician


Discussed Patient with Dr.: Other (Dr Small)





Neurosurgery Physical Exam





- Vitals, I&O, Labs





 I and O











 02/18/19 02/19/19 02/20/19





 05:59 05:59 05:59


 


Intake Total  1050 


 


Output Total  1300 


 


Balance  -250 


 


Weight  65.771 kg 


 


Intake:   


 


  Oral (ml)  700 


 


  IV Intake (ml)  350 


 


Output:   


 


  Urine (ml)  1300 


 


    Bedside Commode  1300 


 


Other:   


 


  Intake Quantity  Yes 





  Sufficient   








 Microbiology











 02/18/19 16:35 Gram Stain - Final





 Back - Bone 








 Vital Signs











Temp Pulse Resp BP Pulse Ox


 


 36.7 C   73   18   102/58 L  95 


 


 02/19/19 03:05  02/19/19 03:05  02/19/19 03:05  02/19/19 03:05  02/19/19 03:05








 Laboratory Results





 02/19/19 04:35 











ICD10 Worksheet


Patient Problems: 


 Problems











Problem Status Onset


 


Discitis Acute  


 


Phlegmon Acute

## 2019-02-19 NOTE — PDMN
Medical Necessity


Medical necessity: [facility] M600 Osteomyelitis, 3 days: 34yo w/ acute onset back 

pain in setting of recent hx L4-5/L5-S1 herniation s/p discectomy Nov 2018 

complicated by dural leak s/p patch 12/18.  Initially OBS for workup with IR, 

ID and Neurosurg consults.  Change to IP status after further eval post IR 

epidural aspiration revealing diskitis/osteomyelitis w/ epidural phlegmon per ID

, IV antibx started, PICC pending, BC pending, pt requiring freq IV opioid 

admin for pain management, remains on cont IVF.  Meets med nec care/IP status 

per MCG for vertebral osteomyelitis and severe pain.  Change to IP status 2/19/ 19@1440 per MD order.

## 2019-02-19 NOTE — ASMTCMCOM
CM Note

 

CM Note                       

Notes:

Pt in with spinal epidural abscess vs. diskitis, she had spinal surgery in [place] in November 2018. Pt 

recently moved to [place] and resides with . PT rec home/outpatient today. Pt on IV antibiotics. 



CM to follow pt progress for d/c planning. 

 

Date Signed:  02/19/2019 04:39 PM

Electronically Signed By:ERNIE Garrett

## 2019-02-20 RX ADMIN — METHOCARBAMOL SCH MG: 750 TABLET ORAL at 11:28

## 2019-02-20 RX ADMIN — METHOCARBAMOL SCH MG: 500 TABLET ORAL at 15:57

## 2019-02-20 RX ADMIN — METHOCARBAMOL SCH MG: 500 TABLET ORAL at 20:11

## 2019-02-20 RX ADMIN — GABAPENTIN SCH MG: 300 CAPSULE ORAL at 20:11

## 2019-02-20 RX ADMIN — DOCUSATE SODIUM AND SENNOSIDES SCH TAB: 50; 8.6 TABLET ORAL at 20:11

## 2019-02-20 RX ADMIN — HYDROMORPHONE HYDROCHLORIDE PRN MG: 4 TABLET ORAL at 14:02

## 2019-02-20 RX ADMIN — DOCUSATE SODIUM AND SENNOSIDES SCH TAB: 50; 8.6 TABLET ORAL at 11:28

## 2019-02-20 RX ADMIN — ACETAMINOPHEN SCH MG: 500 TABLET ORAL at 18:13

## 2019-02-20 RX ADMIN — OXYCODONE HYDROCHLORIDE PRN MG: 15 TABLET ORAL at 04:24

## 2019-02-20 RX ADMIN — VANCOMYCIN HYDROCHLORIDE SCH MLS: 1 INJECTION, POWDER, LYOPHILIZED, FOR SOLUTION INTRAVENOUS at 04:06

## 2019-02-20 RX ADMIN — HYDROMORPHONE HYDROCHLORIDE PRN MG: 1 INJECTION, SOLUTION INTRAMUSCULAR; INTRAVENOUS; SUBCUTANEOUS at 05:41

## 2019-02-20 RX ADMIN — HYDROMORPHONE HYDROCHLORIDE PRN MG: 1 INJECTION, SOLUTION INTRAMUSCULAR; INTRAVENOUS; SUBCUTANEOUS at 08:59

## 2019-02-20 RX ADMIN — VANCOMYCIN HYDROCHLORIDE SCH MLS: 1 INJECTION, POWDER, LYOPHILIZED, FOR SOLUTION INTRAVENOUS at 15:56

## 2019-02-20 RX ADMIN — DEXTROSE MONOHYDRATE SCH MLS: 5 INJECTION, SOLUTION INTRAVENOUS at 15:11

## 2019-02-20 RX ADMIN — ENOXAPARIN SODIUM SCH MG: 100 INJECTION SUBCUTANEOUS at 09:00

## 2019-02-20 RX ADMIN — HYDROMORPHONE HYDROCHLORIDE PRN MG: 4 TABLET ORAL at 20:11

## 2019-02-20 RX ADMIN — DEXTROSE MONOHYDRATE SCH MLS: 5 INJECTION, SOLUTION INTRAVENOUS at 06:35

## 2019-02-20 RX ADMIN — ACETAMINOPHEN PRN MG: 325 TABLET ORAL at 11:27

## 2019-02-20 RX ADMIN — METHOCARBAMOL SCH MG: 750 TABLET ORAL at 05:41

## 2019-02-20 RX ADMIN — KETOROLAC TROMETHAMINE PRN MG: 15 INJECTION, SOLUTION INTRAMUSCULAR; INTRAVENOUS at 00:50

## 2019-02-20 RX ADMIN — GABAPENTIN SCH MG: 300 CAPSULE ORAL at 15:57

## 2019-02-20 RX ADMIN — HYDROMORPHONE HYDROCHLORIDE PRN MG: 4 TABLET ORAL at 23:48

## 2019-02-20 RX ADMIN — DEXTROSE MONOHYDRATE SCH MLS: 5 INJECTION, SOLUTION INTRAVENOUS at 23:49

## 2019-02-20 NOTE — SOAPPROG
SOAP Progress Note


Assessment/Plan: 


Assessment/Plan: 


33 yr old s/p L4-5, L5-S1 lami in [place], then dural repair on 12/27/18


-Patient admitted with increasing back pain, MRI shows diffuse contrast 

enhancement throughout the surgical areas of L4-5, L5-S1 and the disks and 

vertebral bodies at L4-5, L5-S1





Plan:


- awaiting rad read on hip MRI.


-ID following. Will defer antbx treatment to ID  


-Blood cultures and IR biopsy results pending 


-DVT ppx SCDs, TEDs. Ok for Lovenox from our standpoint. Will defer to Medicine


-No surgical intervention indicated at this time.  


- Discussed patient with Dr Small


Please call neurosurgery with questions/concerns 





Subjective: 


Complains of low back pain and left hip spasms both of which are severe with 

movement.


Objective: 


AxO x3


PERRLA/EOMI


MAEx4


5/5 BUE, BLE


Sensation intact to light touch BLE 


Neuro Check Frequency: per routine 


Urinary Catheter in Place: No











Objective: 





 Vital Signs











Temp Pulse Resp BP Pulse Ox


 


 37.3 C   75   16   105/64   99 


 


 02/20/19 07:18  02/20/19 07:18  02/20/19 07:18  02/20/19 07:18  02/20/19 07:18








 











 02/19/19 02/20/19 02/21/19





 05:59 05:59 05:59


 


Intake Total  800 


 


Balance  800 














ICD10 Worksheet


Patient Problems: 


 Problems











Problem Status Onset


 


Discitis Acute  


 


Phlegmon Acute

## 2019-02-20 NOTE — PCMIDPN
Assessment/Plan: 


Assessment/Plan:


* L4-5/L5-S1 diskitis/osteomyelitis with epidural phlegmon:  Gram stain of disk 

aspirate shows gram-negative rods with cultures remaining no growth to date.  

Will continue empiric cefepime pending additional culture data.  Will check 

with lab to see if additional sample available which could be utilized for 

multiplex PCR testing if culture remains negative.  Will discontinue vancomycin 

given no isolation of resistant gram-positive organisms.


* Left hip pain:  Likely related to left psoas inflammation and nerve root 

inflammation; MRI does not show evidence of hip joint effusion to suggest 

septic arthritis.  Continue to follow symptoms over time with antibiotic 

therapy with repeat imaging over time if symptoms progress or fail to resolve.








02/20/19 17:01





Subjective: 





Patient with persistent low back pain and left hip pain with both slightly less 

prominent.  Still not able to walk without pain in left hip.  No itching or 

rash.


Objective: 


 Vital Signs











Temp Pulse Resp BP Pulse Ox


 


 36.5 C   95   14   105/64   93 


 


 02/20/19 15:59  02/20/19 15:59  02/20/19 15:59  02/20/19 15:59  02/20/19 15:59








 











 02/19/19 02/20/19 02/21/19





 05:59 05:59 05:59


 


Intake Total  800 


 


Balance  800 








 











ESR  20 MM/HR (0-20)   02/18/19  12:45    


 


C-Reactive Protein  16.2 mg/L (<10.0)  H  02/18/19  12:45    








Vancomycin # 2


Cefepime # 2


Back aspirate with rare Gram-negative rods, no growth to date


Blood cultures x2 no growth


MRI of left hip with left psoas inflammatory change and inflammatory change of 

L4, L5 and S1 nerve roots





- Physical Exam


General Appearance: alert, no apparent distress, non-toxic


EENT: No scleral icterus, No thrush, No conjunctival petechiae


Respiratory: lungs clear, No respiratory distress


Cardiac/Chest: regular rate, rhythm, No systolic murmur


Extremities: other (Patient with pain with lateral rotation or flexion of left 

hip; limitation in inability to fully flex hip)


Abdomen: non-tender, No distended


Back: other (Incision line intact with small scab without erythema or drainage)

, No spine tenderness


Skin: No rash, No embolic lesions





ICD10 Worksheet


Patient Problems: 


 Problems











Problem Status Onset


 


Discitis Acute  


 


Phlegmon Acute

## 2019-02-20 NOTE — HOSPPROG
Hospitalist Progress Note


Assessment/Plan: 





34yo F with history of L4-5/L5-S1 herniation s/p discectomy (11/27/2018 in 

[place]) complicated by dural leak s/p patch (12/27/2018 at [facility]) presents 

with acute onset back pain. MRI shows discitis. 





1. L4-5/L5-S1 discitis/osteomyelitis and phlegmon: She is not septic/toxic 

appearing. 


   - S/p aspiration by IR on 2/18, gram stain with rare gram negative rods


   - ID consulted, recommend continuing cefepime, stopping vancomycin


   - Follow up cultures


   - Will need for PICC (if bcx remain neg)


   - Neurosurgery consulted, no plan for surgical intervention at this time





2. Left hip pain/back spasms: MRI negative for septic joint or SI joint 

involvement. Does show psoas myositis and L4-S1 nerve inflammation.


   - Increasing dose of scheduled robaxin


   - Starting gabapentin


   - Continue toradol PRN, schedule tylenol


   - Continue PO and IV dilaudid (these will likely be less effective than above

, discussed with patient) 





3. Anemia: Normocytic. Likely multifactorial from iron deficiency and 

inflammation.





VTE ppx: LMWH


Code: full


Diet: regular


Dispo: Remain inpatient for ongoing IV antibiotics, uncontrolled pain


Subjective: Frustrated with left hip pain and back spasms, unable to walk. No 

fevers.


Objective: 


 Vital Signs











Temp Pulse Resp BP Pulse Ox


 


 37.5 C   97   17   116/62   97 


 


 02/20/19 11:10  02/20/19 11:10  02/20/19 11:10  02/20/19 11:10  02/20/19 11:10








 











 02/19/19 02/20/19 02/21/19





 05:59 05:59 05:59


 


Intake Total  800 


 


Balance  800 














- Physical Exam


Constitutional: no apparent distress, appears nourished, not in pain


Eyes: PERRL, anicteric sclera, EOMI


Ears, Nose, Mouth, Throat: moist mucous membranes, hearing normal, ears appear 

normal, no oral mucosal ulcers


Cardiovascular: regular rate and rhythym, no murmur, rub, or gallop


Respiratory: no respiratory distress, no rales or rhonchi, clear to auscultation


Gastrointestinal: normoactive bowel sounds, soft, non-tender abdomen, no 

palpable masses


Genitourinary: no bladder fullness, no bladder tenderness, no renal bruits


Skin: no rashes or abrasions, no fluctuance, no induration


Musculoskeletal: other (unable to assess strenght/rom 2/2 pain)


Neurologic: AAOx3, other (no ankle clonus or knee hyperreflexivity)


Psychiatric: interacting appropriately





ICD10 Worksheet


Patient Problems: 


 Problems











Problem Status Onset


 


Discitis Acute  


 


Phlegmon Acute

## 2019-02-21 RX ADMIN — DOCUSATE SODIUM AND SENNOSIDES SCH TAB: 50; 8.6 TABLET ORAL at 10:08

## 2019-02-21 RX ADMIN — ONDANSETRON PRN MG: 4 TABLET, ORALLY DISINTEGRATING ORAL at 14:05

## 2019-02-21 RX ADMIN — DOCUSATE SODIUM AND SENNOSIDES SCH TAB: 50; 8.6 TABLET ORAL at 20:34

## 2019-02-21 RX ADMIN — GABAPENTIN SCH MG: 100 CAPSULE ORAL at 23:08

## 2019-02-21 RX ADMIN — DEXTROSE MONOHYDRATE SCH MLS: 5 INJECTION, SOLUTION INTRAVENOUS at 06:11

## 2019-02-21 RX ADMIN — KETOROLAC TROMETHAMINE PRN MG: 15 INJECTION, SOLUTION INTRAMUSCULAR; INTRAVENOUS at 01:58

## 2019-02-21 RX ADMIN — DEXTROSE MONOHYDRATE SCH MLS: 5 INJECTION, SOLUTION INTRAVENOUS at 15:30

## 2019-02-21 RX ADMIN — METHOCARBAMOL SCH MG: 500 TABLET ORAL at 15:30

## 2019-02-21 RX ADMIN — HYDROMORPHONE HYDROCHLORIDE PRN MG: 4 TABLET ORAL at 04:15

## 2019-02-21 RX ADMIN — METHOCARBAMOL SCH MG: 500 TABLET ORAL at 10:09

## 2019-02-21 RX ADMIN — GABAPENTIN SCH MG: 300 CAPSULE ORAL at 10:09

## 2019-02-21 RX ADMIN — METHOCARBAMOL SCH MG: 500 TABLET ORAL at 05:33

## 2019-02-21 RX ADMIN — METHOCARBAMOL SCH MG: 500 TABLET ORAL at 20:35

## 2019-02-21 RX ADMIN — ACETAMINOPHEN SCH MG: 500 TABLET ORAL at 12:23

## 2019-02-21 RX ADMIN — DEXTROSE MONOHYDRATE SCH MLS: 5 INJECTION, SOLUTION INTRAVENOUS at 23:08

## 2019-02-21 RX ADMIN — ACETAMINOPHEN SCH: 500 TABLET ORAL at 12:17

## 2019-02-21 RX ADMIN — HYDROMORPHONE HYDROCHLORIDE PRN MG: 4 TABLET ORAL at 23:50

## 2019-02-21 RX ADMIN — ACETAMINOPHEN SCH MG: 500 TABLET ORAL at 01:59

## 2019-02-21 RX ADMIN — GABAPENTIN SCH: 300 CAPSULE ORAL at 18:46

## 2019-02-21 RX ADMIN — ENOXAPARIN SODIUM SCH MG: 100 INJECTION SUBCUTANEOUS at 10:10

## 2019-02-21 RX ADMIN — ACETAMINOPHEN SCH MG: 500 TABLET ORAL at 20:34

## 2019-02-21 NOTE — PCMIDPN
Assessment/Plan: 


Assessment:  33-year-old woman with multilevel vertebral osteomyelitis with 

intervening diskitis presenting approximately 6 weeks after dural repair 

subsequent to her index operation November 2018 diskectomy.  Review of gram 

stain does show distinct Gram-negative rods, although rarely seen.  A culture 

will be septic and an attempt to grow an organism.





1. L4/L5, L5/S1 osteomyelitis with diskitis 


2. Iliopsoas inflammation 


3. Status post L4-S1 diskectomy November 2018, performed in [place]


4. Status post dural leak correction 12/27/2018 





Plan:


1. Continue cefepime


2. Reviewed in detail potential side effects of beta-lactam antibiotics to 

include: allergy, rash, nausea, antibiotic-associated diarrhea, Clostridioides 

difficile colitis.


3. Multiplex PCR testing on retained samples requested be sent to the 

[facility] for bacterial and mycobacterial PCRs


4. Brucella Ab testing, T Spot TB testing will be ordered Monday





Nixon Keenan MD


Infectious Diseases





02/21/19 16:42





Subjective: 


No fevers over the past 24 hr.  Overall back pain slightly improved.  No rashes

, diarrhea. Additional history obtained: was hiking in [facility] 

prior to the initial onset of her back pain last summer. Her and her  

were on their honeymoon and stayed in hotels; did not encounter anyone actively 

coughing or sick on their travels. She has also visited [place], [place], and 

the [place]. She has drank milk straight from a cow in [place] and from a goat in 

the [place]. She notes no acute illnesses during any of her travels.





Objective: 


 Vital Signs











Temp Pulse Resp BP Pulse Ox


 


 36.8 C   85   16   113/62   90 L


 


 02/21/19 16:00  02/21/19 16:00  02/21/19 16:00  02/21/19 16:00  02/21/19 16:00








 Laboratory Results





 02/20/19 17:00 





 











 02/20/19 02/21/19 02/22/19





 05:59 05:59 05:59


 


Intake Total 800 645 125


 


Balance 800 645 125








 











ESR  20 MM/HR (0-20)   02/18/19  12:45    


 


C-Reactive Protein  16.2 mg/L (<10.0)  H  02/18/19  12:45    








Medications











Generic Name Dose Route Start Last Admin





  Trade Name Freq  PRN Reason Stop Dose Admin


 


Cefepime HCl 2 gm/ Sodium  100 mls @ 200 mls/hr  02/19/19 15:00  02/21/19 15:30





  Chloride  IV  03/21/19 14:59  100 mls





  Q8H TAMIKA   








Microbiology





02/18/19 16:35   Back - Bone   Mycobacterial Smear (SUMAN) - Final


02/18/19 16:35   Back - Bone   Gram Stain - Final


02/18/19 16:35   Back - Bone   Mycobacterial Culture - Preliminary


02/18/19 16:35   Back - Bone   Fungal Culture - Preliminary


02/18/19 16:35   Back - Bone   Anaerobic Culture - Preliminary


02/18/19 14:15   Blood   Blood Culture - Preliminary


02/18/19 14:00   Blood   Blood Culture - Preliminary





 Laboratory Tests











  02/18/19 02/18/19 02/18/19





  10:46 12:45 12:45


 


WBC  12.02 H  


 


Hgb  12.1 L  


 


Absolute Neuts (auto)  8.85 H  


 


Absolute Lymphs (auto)  2.31  


 


ESR    20


 


C-Reactive Protein   16.2 H 














  02/19/19





  04:35


 


WBC  10.10 H


 


Hgb  10.7 L


 


Absolute Neuts (auto)  6.61 H


 


Absolute Lymphs (auto)  2.37


 


ESR 


 


C-Reactive Protein 














- Physical Exam


General Appearance: no apparent distress, non-toxic


EENT: No scleral icterus


Respiratory: No accessory muscle use


Neck: supple


Extremities: No erythema


Skin: No erythema


Neuro/Psych: oriented x 3, depressed affect, No normal mood/affect, No confused





- Time Spent With Patient


Time Spent with Patient: greater than 35 minutes


Time Spent with Patient: Greater than 35 minutes spent on this patients care, 

greater than 50% of time spent counseling, educating, and coordinating care 

regarding the above mentioned plan.





ICD10 Worksheet


Patient Problems: 


 Problems











Problem Status Onset


 


Discitis Acute  


 


Phlegmon Acute

## 2019-02-21 NOTE — HOSPPROG
Hospitalist Progress Note


Assessment/Plan: 





34yo F with history of L4-5/L5-S1 herniation s/p discectomy (11/27/2018 in 

[place]) complicated by dural leak s/p patch (12/27/2018 at [facility]) presents 

with acute onset back pain. MRI shows discitis. First encounter, chart reviewed.





#L4-5/L5-S1 discitis/osteomyelitis and phlegmon: 


   - She is not septic/toxic appearing. 


   - S/p aspiration by IR on 2/18, gram stain with rare gram negative rods


   - ID consulted, recommend continuing cefepime, stopping vancomycin


   - Follow up cultures, no growth so far


   - Will need for PICC (if bcx remain neg)


   - Neurosurgery consulted, no plan for surgical intervention at this time





#Left hip pain/back spasms: 


   - MRI negative for septic joint or SI joint involvement. Does show psoas 

myositis and L4-S1 nerve inflammation.


   - Increasing dose of scheduled robaxin


   - Gabapentin


   - Continue toradol PRN, schedule tylenol


   - Continue PO and IV dilaudid





#Anemia: 


   - Normocytic. Likely multifactorial from iron deficiency and inflammation.





VTE ppx: LMWH


Code: full


Diet: regular


Dispo: Remain inpatient for ongoing IV antibiotics, uncontrolled pain


Subjective: Feeling better today. Still some pain in hip area. No other 

specific issues.


Objective: 


 Vital Signs











Temp Pulse Resp BP Pulse Ox


 


 36.5 C   58 L  14   100/63   96 


 


 02/21/19 08:00  02/21/19 08:00  02/21/19 08:00  02/21/19 08:00  02/21/19 08:00








 Laboratory Results





 02/20/19 17:00 





 











 02/20/19 02/21/19 02/22/19





 05:59 05:59 05:59


 


Intake Total 800 645 125


 


Balance 800 645 125














- Physical Exam


Constitutional: no apparent distress, appears nourished, uncomfortable


Eyes: PERRL, anicteric sclera, EOMI


Ears, Nose, Mouth, Throat: moist mucous membranes, hearing normal, ears appear 

normal


Cardiovascular: regular rate and rhythym, No JVD, No edema


Respiratory: no respiratory distress, no rales or rhonchi, reduced air movement


Gastrointestinal: normoactive bowel sounds, No tenderness, No ascites


Skin: warm, normal color, No mottled


Musculoskeletal: no joint effusions, pain with ROM, generalized weakness


Neurologic: AAOx3


Psychiatric: interacting appropriately, not anxious, not encephalopathic





ICD10 Worksheet


Patient Problems: 


 Problems











Problem Status Onset


 


Discitis Acute  


 


Phlegmon Acute

## 2019-02-21 NOTE — NEUSURGPN
Assessment/Plan: 


33 yr old s/p L4-5, L5-S1 lami in [place], then dural repair on 12/27/18





Plan:


-No focal hip issue seen on MRI, there is left psoas muscle edema and 

enhancement from L4-S1 which is also likely contributing to her pain.


-Patient reports new numbness in right 4th and 5th digits, will continue to 

follow.     


-ID following. Will defer antbx treatment to ID  


-Blood cultures and IR biopsy negative so far 


-DVT ppx SCDs, TEDs. Ok for Lovenox from our standpoint. Will defer to Medicine


-No surgical intervention indicated at this time.  


- Discussed patient with Dr Small


Please call neurosurgery with questions/concerns 





Subjective: 


resting in bed, spasms better overnight with medication 


Objective: 


AxO x4


PERRLA


FITCH x4


5/5 BLE


Sensation intact to light touch BLE 


Neuro Check Frequency: per routine 


Urinary Catheter in Place: No





- Physician


Discussed Patient with Dr.: Other (Dr Small)





Neurosurgery Physical Exam





- Vitals, I&O, Labs





 I and O











 02/20/19 02/21/19 02/22/19





 05:59 05:59 05:59


 


Intake Total 800 645 125


 


Balance 800 645 125


 


Intake:   


 


  Oral (ml) 450 500 


 


  IV Infused (ml) 350 145 125


 


    Cefepime HCl 2 gm In Ns 100 100 105





    100 ml @ 200 mls/hr IV   





    Q8H TAMIKA Rx#:[id number]   


 


    Ns 1,000 ml @ 30 mls/hr  45 20





    IV CONT TAMIKA Rx#:   





    [id number]   


 


    Vancomycin 1.25 gm In Ns 250  





    250 ml @ 166.667 mls/hr   





    IV Q12H TAMIKA Rx#:   





    [id number]   


 


Other:   


 


  Intake Quantity  Yes 





  Sufficient   


 


  Number of Voids   


 


    Toilet 1 1 








 Vital Signs











Temp Pulse Resp BP Pulse Ox


 


 36.8 C   83   16   102/55 L  92 


 


 02/21/19 00:00  02/21/19 00:00  02/21/19 00:00  02/21/19 00:00  02/21/19 00:00








 Laboratory Results





 02/20/19 17:00 











ICD10 Worksheet


Patient Problems: 


 Problems











Problem Status Onset


 


Discitis Acute  


 


Phlegmon Acute

## 2019-02-22 PROCEDURE — 02HV33Z INSERTION OF INFUSION DEVICE INTO SUPERIOR VENA CAVA, PERCUTANEOUS APPROACH: ICD-10-PCS | Performed by: RADIOLOGY

## 2019-02-22 RX ADMIN — DIAZEPAM PRN MG: 5 TABLET ORAL at 22:04

## 2019-02-22 RX ADMIN — ACETAMINOPHEN SCH MG: 500 TABLET ORAL at 20:26

## 2019-02-22 RX ADMIN — METHOCARBAMOL SCH MG: 500 TABLET ORAL at 11:26

## 2019-02-22 RX ADMIN — ENOXAPARIN SODIUM SCH MG: 100 INJECTION SUBCUTANEOUS at 10:19

## 2019-02-22 RX ADMIN — METHOCARBAMOL SCH MG: 500 TABLET ORAL at 20:27

## 2019-02-22 RX ADMIN — GABAPENTIN SCH MG: 100 CAPSULE ORAL at 22:03

## 2019-02-22 RX ADMIN — METHOCARBAMOL SCH MG: 500 TABLET ORAL at 05:57

## 2019-02-22 RX ADMIN — GABAPENTIN SCH MG: 100 CAPSULE ORAL at 10:20

## 2019-02-22 RX ADMIN — METHOCARBAMOL SCH MG: 500 TABLET ORAL at 15:56

## 2019-02-22 RX ADMIN — GABAPENTIN SCH MG: 100 CAPSULE ORAL at 15:56

## 2019-02-22 RX ADMIN — DEXTROSE MONOHYDRATE SCH MLS: 5 INJECTION, SOLUTION INTRAVENOUS at 05:56

## 2019-02-22 RX ADMIN — DOCUSATE SODIUM AND SENNOSIDES SCH TAB: 50; 8.6 TABLET ORAL at 20:27

## 2019-02-22 RX ADMIN — DOCUSATE SODIUM AND SENNOSIDES SCH TAB: 50; 8.6 TABLET ORAL at 10:19

## 2019-02-22 RX ADMIN — POLYETHYLENE GLYCOL 3350 PRN GM: 17 POWDER, FOR SOLUTION ORAL at 10:27

## 2019-02-22 RX ADMIN — DIAZEPAM PRN MG: 5 TABLET ORAL at 13:40

## 2019-02-22 RX ADMIN — ACETAMINOPHEN SCH MG: 500 TABLET ORAL at 04:19

## 2019-02-22 RX ADMIN — HYDROMORPHONE HYDROCHLORIDE PRN MG: 4 TABLET ORAL at 19:56

## 2019-02-22 RX ADMIN — ACETAMINOPHEN SCH: 500 TABLET ORAL at 12:40

## 2019-02-22 NOTE — HOSPPROG
Hospitalist Progress Note


Assessment/Plan: 





34yo F with history of L4-5/L5-S1 herniation s/p discectomy (11/27/2018 in 

[place]) complicated by dural leak s/p patch (12/27/2018 at [facility]) presents 

with acute onset back pain. MRI shows discitis. 





#L4-5/L5-S1 discitis/osteomyelitis and phlegmon: 


   - unknown pathogen


   - Repeat MRI as symptoms unchanged 


   - She is not septic/toxic appearing. 


   - S/p aspiration by IR on 2/18, gram stain with rare gram negative rods


   - ID consulted, recommend continuing cefepime, stopping vancomycin


   - Follow up cultures, no growth so far


   - place PICC (if bcx remain neg)


   - Neurosurgery consulted, no plan for surgical intervention at this time





#Left hip pain/back spasms: 


   - MRI negative for septic joint or SI joint involvement. Does show psoas 

myositis and L4-S1 nerve inflammation.


   - scheduled robaxin


   - Gabapentin


   - Continue toradol PRN, schedule tylenol


   - Continue PO and IV dilaudid, valium


   - repeat MRI





#Anemia: 


   - Normocytic. Likely multifactorial from iron deficiency and inflammation.





VTE ppx: LMWH


Code: full


Diet: regular


Dispo: Remain inpatient for ongoing IV antibiotics, uncontrolled pain


Subjective: Still having pain. No new symptoms.


Objective: 


 Vital Signs











Temp Pulse Resp BP Pulse Ox


 


 36.4 C   75   17   112/65   93 


 


 02/22/19 08:00  02/22/19 08:00  02/22/19 08:00  02/22/19 08:00  02/22/19 08:00








 Laboratory Results





 02/20/19 17:00 





 











 02/21/19 02/22/19 02/23/19





 05:59 05:59 05:59


 


Intake Total 645 755 


 


Output Total   1


 


Balance 645 755 -1














- Physical Exam


Constitutional: appears nourished, uncomfortable


Eyes: PERRL, anicteric sclera


Ears, Nose, Mouth, Throat: moist mucous membranes, hearing normal


Cardiovascular: No JVD, No edema


Respiratory: no respiratory distress, reduced air movement


Gastrointestinal: No tenderness, No ascites


Skin: warm, normal color


Musculoskeletal: joint tenderness, pain with ROM, generalized weakness


Neurologic: AAOx3


Psychiatric: interacting appropriately, not anxious, not encephalopathic





ICD10 Worksheet


Patient Problems: 


 Problems











Problem Status Onset


 


Discitis Acute  


 


Phlegmon Acute

## 2019-02-22 NOTE — ASMTCMCOM
CM Note

 

CM Note                       

Notes:

Spoke w/MD, pt will need IV abx at home but will not dc before Monday. CHARLENE sent referral to 

[facility] will arrange nursing.



DC Plan: Home Infusion/ [facility]



 

 

Date Signed:  02/22/2019 05:04 PM

Electronically Signed By:Rosa Uriarte RN

## 2019-02-22 NOTE — ASMTCMCOM
CM Note

 

CM Note                       

Notes:

Reviewed chart, pt admitted for back pain, and infection from previous back surgery. She is 

otherwise independent and PT has cleared her for home. She will dc home w/support of  when 

medically stable, CM available for any changes.



DC Plan: Home/ outpt PT

 

Date Signed:  02/22/2019 10:40 AM

Electronically Signed By:Rosa Uriarte RN

## 2019-02-22 NOTE — NEUSURGPN
Assessment/Plan: 


Assessment: 33 yr old s/p L4-5, L5-S1 lami in [place], then dural repair on 12/27 /18





Plan:


-No focal hip issue seen on MRI, there is left psoas muscle edema and 

enhancement from L4-S1 which is also likely contributing to her pain/spasms


-Patient reported yesterday new numbness in right 4th and 5th digits, will 

continue to follow.     


-ID following. Will defer antbx treatment to ID  


-Blood cultures and IR biopsy negative so far 


-DVT ppx SCDs, TEDs. Ok for Lovenox from our standpoint. Will defer to Medicine


-No surgical intervention indicated at this time-we will continue to follow  


-discussed with Dr Small


-please call neurosurgery with any questions/concerns 





Subjective: 


Awake and alert.  NAD.  Eating/drinking and voiding.  No f/c/n/v/d.  No ha/neck/

chest/abd or gu complaints.  


Objective: 


AxO x4


PERRLA


FITCH x4


5/5 BLE


Sensation intact to light touch BLE 


Neuro Check Frequency: per routine


Urinary Catheter in Place: No





- Physician


Discussed Patient with Dr.: Other (Staci)





Neurosurgery Physical Exam





- Vitals, I&O, Labs





 I and O











 02/21/19 02/22/19 02/23/19





 05:59 05:59 05:59


 


Intake Total 645 755 


 


Balance 645 755 


 


Intake:   


 


  Oral (ml) 500 500 


 


  IV Infused (ml) 145 255 


 


    Cefepime HCl 2 gm In Ns 100 210 





    100 ml @ 200 mls/hr IV   





    Q8H TAMIKA Rx#:[id number]   


 


    Ns 1,000 ml @ 30 mls/hr 45 45 





    IV CONT TAMIKA Rx#:   





    [id number]   


 


Other:   


 


  Intake Quantity Yes  





  Sufficient   


 


  Number of Voids   


 


    Toilet 1 1 








 Vital Signs











Temp Pulse Resp BP Pulse Ox


 


 36.8 C   91   16   105/71   92 


 


 02/22/19 00:00  02/22/19 00:00  02/22/19 00:00  02/22/19 00:00  02/22/19 00:00








 Laboratory Results





 02/20/19 17:00 











ICD10 Worksheet


Patient Problems: 


 Problems











Problem Status Onset


 


Discitis Acute  


 


Phlegmon Acute

## 2019-02-23 LAB
INR PPP: 1.03 (ref 0.83–1.16)
PLATELET # BLD: 362 10^3/UL (ref 150–400)
PROTHROMBIN TIME: 13.7 SEC (ref 12–15)

## 2019-02-23 PROCEDURE — 0S933ZX DRAINAGE OF LUMBOSACRAL JOINT, PERCUTANEOUS APPROACH, DIAGNOSTIC: ICD-10-PCS | Performed by: RADIOLOGY

## 2019-02-23 PROCEDURE — 009U3ZX DRAINAGE OF SPINAL CANAL, PERCUTANEOUS APPROACH, DIAGNOSTIC: ICD-10-PCS | Performed by: RADIOLOGY

## 2019-02-23 RX ADMIN — METHOCARBAMOL SCH MG: 500 TABLET ORAL at 18:03

## 2019-02-23 RX ADMIN — GABAPENTIN SCH MG: 100 CAPSULE ORAL at 09:47

## 2019-02-23 RX ADMIN — METHOCARBAMOL SCH MG: 500 TABLET ORAL at 12:08

## 2019-02-23 RX ADMIN — KETOROLAC TROMETHAMINE PRN MG: 15 INJECTION, SOLUTION INTRAMUSCULAR; INTRAVENOUS at 09:55

## 2019-02-23 RX ADMIN — HYDROMORPHONE HYDROCHLORIDE PRN MG: 4 TABLET ORAL at 02:23

## 2019-02-23 RX ADMIN — ACETAMINOPHEN SCH MG: 500 TABLET ORAL at 05:07

## 2019-02-23 RX ADMIN — HYDROMORPHONE HYDROCHLORIDE PRN MG: 4 TABLET ORAL at 14:41

## 2019-02-23 RX ADMIN — METHOCARBAMOL SCH MG: 500 TABLET ORAL at 20:46

## 2019-02-23 RX ADMIN — ACETAMINOPHEN SCH MG: 500 TABLET ORAL at 20:46

## 2019-02-23 RX ADMIN — METHOCARBAMOL SCH MG: 500 TABLET ORAL at 05:06

## 2019-02-23 RX ADMIN — ACETAMINOPHEN SCH MG: 500 TABLET ORAL at 12:08

## 2019-02-23 RX ADMIN — GABAPENTIN SCH MG: 100 CAPSULE ORAL at 18:03

## 2019-02-23 RX ADMIN — GABAPENTIN SCH MG: 100 CAPSULE ORAL at 21:47

## 2019-02-23 RX ADMIN — DOCUSATE SODIUM AND SENNOSIDES SCH TAB: 50; 8.6 TABLET ORAL at 20:46

## 2019-02-23 RX ADMIN — DOCUSATE SODIUM AND SENNOSIDES SCH TAB: 50; 8.6 TABLET ORAL at 09:47

## 2019-02-23 RX ADMIN — ENOXAPARIN SODIUM SCH: 100 INJECTION SUBCUTANEOUS at 11:50

## 2019-02-23 NOTE — PDRADPN
Radiology Procedure Note


Date of Procedure: 02/23/19


Radiologist: Ninfa Johnston


Anesthesia: IV Sedation


Pre-op Diagnosis: osteomyelitis and discitis


Post-op Diagnosis: same


Indication: need tissue for cultures


Procedure: RT L4-5 posterior fluid aspiration; L5-S1 disc aspiration


Inf/Abcess present in the surg proc area at time of surgery?: No

## 2019-02-23 NOTE — HOSPPROG
Hospitalist Progress Note


Assessment/Plan: 





34yo F with history of L4-5/L5-S1 herniation s/p discectomy (11/27/2018 in 

[place]) complicated by dural leak s/p patch (12/27/2018 at [facility]) presents 

with acute onset back pain. MRI shows discitis/osteomyelitis and cellulitis. 





#L4-5/L5-S1 discitis/osteomyelitis and phlegmon: Rpt MRI yest with persistent 

phlegmon / abscess. S/p aspiration by IR on 2/18, gram stain with rare gnr's.  

Discussed with Dr. Barr.  PICC placed 2/22.  wbc's normalized, but CRP 16--> 

66.


   - IR to drain paraspinal abscess, hopefully Cx will produce organism, no 

orgs on GS


   - cont ceftriaxone day 2, per ID.  S/P 3 days cefepime


   - Follow up cultures


   - Neurosurgery consulted, no plan for surgical intervention at this time 

unless develops neurologic deficits





#Left hip pain/back spasms: improved.  MRI shows psoas and neuroforaminal 

cellulitis at L4-S1 


   - atbx as above


   - cont robaxin, Gabapentin


   - Continue toradol PRN, schedule tylenol


   - Continue PO and IV dilaudid, valium





#Anemia: - Normocytic. Likely multifactorial from iron deficiency and 

inflammation.


   - follow





VTE ppx: LMWH


Code: full


Diet: regular


Dispo: cont inpt


Subjective: Pt doing ok.  Says back pain and spasms are a bit better.  No 

fevers.  Still uncomfortable.


Objective: 


 Vital Signs











Temp Pulse Resp BP Pulse Ox


 


 36.8 C   81   16   105/71   90 L


 


 02/23/19 11:47  02/23/19 11:47  02/23/19 08:00  02/23/19 11:47  02/23/19 11:47








 Laboratory Results





 02/23/19 05:12 





 02/23/19 05:12 





 











 02/22/19 02/23/19 02/24/19





 05:59 05:59 05:59


 


Intake Total 755 500 


 


Output Total  1 


 


Balance 755 499 














- Physical Exam


Constitutional: no apparent distress


Eyes: PERRL


Ears, Nose, Mouth, Throat: moist mucous membranes


Cardiovascular: regular rate and rhythym


Respiratory: no respiratory distress, clear to auscultation


Gastrointestinal: normoactive bowel sounds, soft, non-tender abdomen


Skin: warm


Musculoskeletal: full muscle strength


Neurologic: AAOx3


Psychiatric: interacting appropriately





ICD10 Worksheet


Patient Problems: 


 Problems











Problem Status Onset


 


Discitis Acute  


 


Phlegmon Acute

## 2019-02-23 NOTE — PCMIDPN
Assessment/Plan: 





34yo F with history of L4-5/L5-S1 herniation s/p discectomy (11/27/2018 in 

[place]) complicated by dural leak s/p patch (12/27/2018 at [facility]) 





# Post op L4/L5, L5/S1 osteomyelitis with diskitis, epidural phlegmon and psoas 

inflammation with unknown pathogen,  increased back pain, no LE weakness.  CRP 

is increasing 


--try to aspirate early paraspinal abscess L4 to S1


--appreciate neurosurg involvement





meds


ceftriaxone 2gm IV daily #2


cefepime 2gm IV q8h s/p 3 days





Microbiology


02/18/19 16:35   Back - Bone  AFB smear neg


02/18/19 16:35   Back - Bone  possible rare GNR (but repeat gram stains were 

negative)


02/18/19 14:15   Blood Cx (2) NGTD


Brucella serology pending








02/23/19 10:22





Subjective: 





increasing back pain


walked farmer 1x yesterday


constipation, just rec'd enema


Objective: 


 Vital Signs











Temp Pulse Resp BP Pulse Ox


 


 36.8 C   80   16   104/66   89 L


 


 02/23/19 08:00  02/23/19 08:00  02/23/19 08:00  02/23/19 08:00  02/23/19 08:00








 Laboratory Results





 02/23/19 05:12 





 02/23/19 05:12 





 











 02/22/19 02/23/19 02/24/19





 05:59 05:59 05:59


 


Intake Total 755 500 


 


Output Total  1 


 


Balance 755 499 








 











ESR  20 MM/HR (0-20)   02/18/19  12:45    


 


C-Reactive Protein  66.6 mg/L (<10.0)  H  02/23/19  05:12    














- Physical Exam


General Appearance: alert, no apparent distress, non-toxic


EENT: pale conjunctiva, No thrush


Respiratory: lungs clear, No accessory muscle use


Cardiac/Chest: regular rate, rhythm


Skin: No rash


Neuro/Psych: alert, normal mood/affect, oriented x 3





- Time Spent With Patient


Time Spent with Patient: greater than 35 minutes (reviewed plan for additional w

/u including IR aspiration, hold on abx changes for now. care coordinated with 

Dr. Johnston and Dr. Small)


Time Spent with Patient: Greater than 35 minutes spent on this patients care, 

greater than 50% of time spent counseling, educating, and coordinating care 

regarding the above mentioned plan.





ICD10 Worksheet


Patient Problems: 


 Problems











Problem Status Onset


 


Discitis Acute  


 


Phlegmon Acute

## 2019-02-23 NOTE — NEUSURGPN
Assessment/Plan: 





Assessment: 33 yr old s/p L4-5, L5-S1 lami in [place], then dural repair on 12/27 /18





Plan:


-New lumbar MRI with increase paraspinal fluid collection, IR guide aspiration 

with drain placement ordered. Requested the aspirate get sent for culture


-ID following, appreciate there management


-Patient concerned about urine malodor. UA ordered


-Blood cultures and IR biopsy negative so far 


-DVT ppx SCDs, TEDs. Ok for Lovenox from our standpoint. Will defer to Medicine


-No surgical intervention indicated at this time


-discussed with Dr Small


-please call neurosurgery with any questions/concerns 





 


Subjective: 


low back albert and left hip pain increased overnight





Objective: 


NAD A&Ox3 MAEx4 5/5 and equal in BUE and BLE. Incision c/d/i





- Physician


Discussed Patient with Dr.: Other (Staci)





Neurosurgery Physical Exam





- Vitals, I&O, Labs





 I and O











 02/22/19 02/23/19 02/24/19





 05:59 05:59 05:59


 


Intake Total 755 500 


 


Output Total  1 


 


Balance 755 499 


 


Intake:   


 


  Oral (ml) 500 500 


 


  IV Infused (ml) 255  


 


    Cefepime HCl 2 gm In Ns 210  





    100 ml @ 200 mls/hr IV   





    Q8H TAMIKA Rx#:[id number]   


 


    Ns 1,000 ml @ 30 mls/hr 45  





    IV CONT TAMIKA Rx#:   





    [id number]   


 


Output:   


 


  Urine (ml)  1 


 


    Toilet  1 


 


Other:   


 


  Number of Voids   


 


    Toilet 1 2 








 Vital Signs











Temp Pulse Resp BP Pulse Ox


 


 36.8 C   80   16   104/66   89 L


 


 02/23/19 08:00  02/23/19 08:00  02/23/19 08:00  02/23/19 08:00  02/23/19 08:00








 Laboratory Results





 02/23/19 05:12 





 02/23/19 05:12 











ICD10 Worksheet


Patient Problems: 


 Problems











Problem Status Onset


 


Discitis Acute  


 


Phlegmon Acute

## 2019-02-24 LAB — PLATELET # BLD: 354 10^3/UL (ref 150–400)

## 2019-02-24 RX ADMIN — METHOCARBAMOL SCH MG: 500 TABLET ORAL at 12:59

## 2019-02-24 RX ADMIN — METHOCARBAMOL SCH MG: 500 TABLET ORAL at 05:05

## 2019-02-24 RX ADMIN — GABAPENTIN SCH MG: 100 CAPSULE ORAL at 22:59

## 2019-02-24 RX ADMIN — GABAPENTIN SCH MG: 100 CAPSULE ORAL at 09:48

## 2019-02-24 RX ADMIN — DOCUSATE SODIUM AND SENNOSIDES SCH TAB: 50; 8.6 TABLET ORAL at 20:34

## 2019-02-24 RX ADMIN — METHOCARBAMOL SCH MG: 500 TABLET ORAL at 16:25

## 2019-02-24 RX ADMIN — GABAPENTIN SCH MG: 100 CAPSULE ORAL at 16:25

## 2019-02-24 RX ADMIN — ACETAMINOPHEN SCH: 500 TABLET ORAL at 20:34

## 2019-02-24 RX ADMIN — ACETAMINOPHEN SCH MG: 500 TABLET ORAL at 05:08

## 2019-02-24 RX ADMIN — KETOROLAC TROMETHAMINE PRN MG: 15 INJECTION, SOLUTION INTRAMUSCULAR; INTRAVENOUS at 20:28

## 2019-02-24 RX ADMIN — DOCUSATE SODIUM AND SENNOSIDES SCH TAB: 50; 8.6 TABLET ORAL at 09:44

## 2019-02-24 RX ADMIN — ACETAMINOPHEN SCH MG: 500 TABLET ORAL at 12:59

## 2019-02-24 RX ADMIN — ONDANSETRON PRN MG: 4 TABLET, ORALLY DISINTEGRATING ORAL at 04:34

## 2019-02-24 RX ADMIN — METHOCARBAMOL SCH MG: 500 TABLET ORAL at 20:32

## 2019-02-24 RX ADMIN — HYDROMORPHONE HYDROCHLORIDE PRN MG: 4 TABLET ORAL at 03:15

## 2019-02-24 NOTE — PCMIDPN
Assessment/Plan: 





32yo F with history of L4-5/L5-S1 herniation s/p discectomy (11/27/2018 in 

[place]) complicated by dural leak s/p patch (12/27/2018 at [facility]) 





# Post op L4/L5, L5/S1 osteomyelitis with diskitis, epidural phlegmon and L 

psoas inflammation with unknown pathogen, pain stable today.


--continue ceftriaxone


--no abx changes for now


--yesterday of RT L4-5 posterior fluid aspiration; L5-S1 disc aspiration


--repeat Labs in AM


--hold discharge until back pain adequately controlled, patient and family does 

not feel that has been reached





meds


ceftriaxone 2gm IV daily #3


cefepime 2gm IV q8h s/p 3 days





Microbiology


02/18/19 16:35   Back - Bone  AFB smear neg


02/18/19 16:35   Back - Bone  possible rare GNR (but repeat gram stains were 

negative)


02/18/19 14:15   Blood Cx (2) NGTD


Brucella serology pending


02/23/19 17:00   Back - Aspirate  disc space: gram stain neg


02/23/19 17:00   Back - Aspirate  posterior R fluid collection: gram stain neg


AFB smears from aspirate 2/23 pending





Subjective: 





had bowel movement yesterday


back pain worse at night w spasm 7/10, at time of my visit 3/10


appetite ok


no diarrhea


no rash


Objective: 


 Vital Signs











Temp Pulse Resp BP Pulse Ox


 


 36.8 C   67   16   106/65   93 


 


 02/24/19 07:37  02/24/19 07:37  02/24/19 07:37  02/24/19 07:37  02/24/19 07:37








 Microbiology











 02/23/19 17:00 Gram Stain - Final





 Back - Aspirate 


 


 02/23/19 17:00 Gram Stain - Final





 Back - Aspirate 


 


 02/23/19 08:04 Gram Stain - Final





 Vertebral Disc Anaerobic Culture - Final








 Laboratory Results





 02/24/19 03:20 





 02/23/19 05:12 





 











 02/23/19 02/24/19 02/25/19





 05:59 05:59 05:59


 


Intake Total 500 650 


 


Output Total 1 300 


 


Balance 499 350 








 











ESR  20 MM/HR (0-20)   02/18/19  12:45    


 


C-Reactive Protein  66.6 mg/L (<10.0)  H  02/23/19  05:12    














- Physical Exam


General Appearance: alert, no apparent distress


EENT: pale conjunctiva, No scleral icterus, No thrush


Respiratory: lungs clear, No accessory muscle use


Neck: supple


Cardiac/Chest: regular rate, rhythm, No systolic murmur


Extremities: No pedal edema


Abdomen: normal bowel sounds, non-tender, soft


Skin: No rash


Neuro/Psych: alert, normal mood/affect, oriented x 3, other (ambulating in the 

room w walker)





- Line/s


  ** RUE PICC


Lines: No drainage, No erythema





ICD10 Worksheet


Patient Problems: 


 Problems











Problem Status Onset


 


Discitis Acute  


 


Phlegmon Acute

## 2019-02-24 NOTE — ASMTCMCOM
CM Note

 

CM Note                       

Notes:

Chart review: patient continuing IV antibiotics, discharge when pain adequately controlled. CM to 

follow. 



D/C Plan: home with [facility] for IV antibiotics. 

 

Date Signed:  02/24/2019 01:41 PM

Electronically Signed By:Gracie Ribera

## 2019-02-24 NOTE — NEUSURGPN
Assessment/Plan: 





Assessment: 33 yr old s/p L4-5, L5-S1 lami in [place], then dural repair on 12/27 /18





Plan:


-New lumbar MRI with increase paraspinal fluid collection, aspiration yesterday 

of the disc space and posterior fluid collection. Patient state her pain is 

improved since the procedure


-ID following, appreciate there management


-Blood cultures and IR biopsy negative so far 


-DVT ppx SCDs, TEDs. Ok for Lovenox 


-No surgical intervention indicated at this time


-discussed with Dr Small


-please call neurosurgery with any questions/concerns 





 


Subjective: 


low back pain improved after aspiration. some stomach ache and nausea over night

, improved with Zofran


Objective: 


NAD A&Ox3 MAEx4 5/5 and equal in BUE and BLE. Incision c/d/i








- Physician


Discussed Patient with Dr.: Other (Staci)





Neurosurgery Physical Exam





- Vitals, I&O, Labs





 I and O











 02/23/19 02/24/19 02/25/19





 05:59 05:59 05:59


 


Intake Total 500 650 


 


Output Total 1 300 


 


Balance 499 350 


 


Intake:   


 


  Oral (ml) 500 250 


 


  IV Intake (ml)  400 


 


Output:   


 


  Urine (ml) 1 300 


 


    Toilet 1 300 


 


  Estimated Blood Loss (ml)  0 


 


Other:   


 


  Intake Quantity  Yes 





  Sufficient   


 


  Number of Voids   


 


    Toilet 2 1 


 


  Number of Stools   


 


    Toilet  1 








 Microbiology











 02/23/19 17:00 Gram Stain - Final





 Back - Aspirate 


 


 02/23/19 17:00 Gram Stain - Final





 Back - Aspirate 


 


 02/23/19 08:04 Gram Stain - Final





 Vertebral Disc Anaerobic Culture - Final








 Vital Signs











Temp Pulse Resp BP Pulse Ox


 


 36.8 C   67   16   106/65   93 


 


 02/24/19 07:37  02/24/19 07:37  02/24/19 07:37  02/24/19 07:37  02/24/19 07:37








 Laboratory Results





 02/24/19 03:20 





 02/23/19 05:12 











ICD10 Worksheet


Patient Problems: 


 Problems











Problem Status Onset


 


Discitis Acute  


 


Phlegmon Acute

## 2019-02-24 NOTE — HOSPPROG
Hospitalist Progress Note


Assessment/Plan: 





32yo F with history of L4-5/L5-S1 herniation s/p discectomy (11/27/2018 in 

[place]) complicated by dural leak s/p patch (12/27/2018 at [facility]) presents 

with acute onset back pain. MRI shows discitis/osteomyelitis and cellulitis. 





#L4-5/L5-S1 discitis/osteomyelitis and phlegmon: Rpt MRI yest with persistent 

phlegmon / abscess. S/p aspiration by IR on 2/18, gram stain with rare gnr's.  

Discussed with Dr. Barr.  PICC placed 2/22.  wbc's normalized, but CRP 16--> 

66.


   - s/p IR drainage of paraspinal abscess, Cx's pending, ngtd


   - cont ceftriaxone day 3, per ID.  S/P 3 days cefepime.  Will discuss DOT 

with ID, suspect she'll need at least 6 weeks total.


   - Neurosurgery following, no surgical intervention planned unless she 

develops neurodeficits





#Left hip pain/back spasms: MRI shows psoas and neuroforaminal cellulitis at L4-

S1.  Pt had increased pain last night.


   - atbx as above


   - cont robaxin, Gabapentin


   - cont scheduled tylenol, resume toradol for anti-inflammatory effect as 

this has been helpful


   - Continue prn PO and IV dilaudid, valium





#Anemia: - Normocytic. Likely multifactorial from iron deficiency and 

inflammation.


   - follow





VTE ppx: LMWH


Code: full


Diet: regular


Dispo: cont inpt


Subjective: Pt doing ok.  Had a rough night with increased left hip and back 

pain.  Dilaudid did not help.  Toradol was helpful previously.  No pain 

currently.  Eating and drinking fairly well.  No fevers.


Objective: 


 Vital Signs











Temp Pulse Resp BP Pulse Ox


 


 36.8 C   67   16   106/65   93 


 


 02/24/19 07:37  02/24/19 07:37  02/24/19 07:37  02/24/19 07:37  02/24/19 07:37








 Microbiology











 02/23/19 17:00 Gram Stain - Final





 Back - Aspirate 


 


 02/23/19 17:00 Gram Stain - Final





 Back - Aspirate 


 


 02/23/19 08:04 Gram Stain - Final





 Vertebral Disc Anaerobic Culture - Final








 Laboratory Results





 02/24/19 03:20 





 02/23/19 05:12 





 











 02/23/19 02/24/19 02/25/19





 05:59 05:59 05:59


 


Intake Total 500 650 


 


Output Total 1 300 


 


Balance 499 350 








 











PT  13.7 SEC (12.0-15.0)   02/23/19  13:30    


 


INR  1.03  (0.83-1.16)   02/23/19  13:30    














- Physical Exam


Constitutional: no apparent distress


Eyes: PERRL


Ears, Nose, Mouth, Throat: moist mucous membranes


Cardiovascular: regular rate and rhythym


Respiratory: no respiratory distress


Gastrointestinal: normoactive bowel sounds, soft, non-tender abdomen


Skin: warm


Musculoskeletal: full muscle strength


Neurologic: AAOx3


Psychiatric: interacting appropriately





ICD10 Worksheet


Patient Problems: 


 Problems











Problem Status Onset


 


Discitis Acute  


 


Phlegmon Acute

## 2019-02-25 LAB — PLATELET # BLD: 326 10^3/UL (ref 150–400)

## 2019-02-25 RX ADMIN — DOCUSATE SODIUM AND SENNOSIDES SCH TAB: 50; 8.6 TABLET ORAL at 20:11

## 2019-02-25 RX ADMIN — ACETAMINOPHEN SCH MG: 500 TABLET ORAL at 20:10

## 2019-02-25 RX ADMIN — GABAPENTIN SCH MG: 100 CAPSULE ORAL at 21:02

## 2019-02-25 RX ADMIN — ACETAMINOPHEN SCH MG: 500 TABLET ORAL at 04:02

## 2019-02-25 RX ADMIN — GABAPENTIN SCH MG: 100 CAPSULE ORAL at 09:12

## 2019-02-25 RX ADMIN — GABAPENTIN SCH MG: 100 CAPSULE ORAL at 16:33

## 2019-02-25 RX ADMIN — METHOCARBAMOL SCH MG: 500 TABLET ORAL at 20:11

## 2019-02-25 RX ADMIN — POLYETHYLENE GLYCOL 3350 PRN GM: 17 POWDER, FOR SOLUTION ORAL at 16:45

## 2019-02-25 RX ADMIN — METHOCARBAMOL SCH MG: 500 TABLET ORAL at 12:48

## 2019-02-25 RX ADMIN — DOCUSATE SODIUM AND SENNOSIDES SCH TAB: 50; 8.6 TABLET ORAL at 09:12

## 2019-02-25 RX ADMIN — METHOCARBAMOL SCH MG: 500 TABLET ORAL at 16:33

## 2019-02-25 RX ADMIN — METHOCARBAMOL SCH MG: 500 TABLET ORAL at 06:10

## 2019-02-25 RX ADMIN — ACETAMINOPHEN SCH MG: 500 TABLET ORAL at 12:48

## 2019-02-25 RX ADMIN — KETOROLAC TROMETHAMINE PRN MG: 15 INJECTION, SOLUTION INTRAMUSCULAR; INTRAVENOUS at 21:03

## 2019-02-25 NOTE — NEUSURGPN
Assessment/Plan: 





Assessment: 33 yr old s/p L4-5, L5-S1 lami in [place], then dural repair on 12/27 /18





Patient stable this morning





Plan:


-Lumbar MRI (2/22) demonstrated with increase paraspinal fluid collection, 

aspiration yesterday of the disc space and posterior fluid collection. Patient 

state her pain is improved since the procedure


-ID following, appreciate there management


-Cx with NGTD


-DVT ppx SCDs, TEDs. Ok for Lovenox 


-No surgical intervention indicated at this time


-discussed with Dr Small


-please call neurosurgery with any questions/concerns 





 


Subjective: 


back and left hip pain improved. 


Objective: 


NAD A&Ox3 


MAEx4 5/5 and equal in BUE and BLE


Incision c/d/i wiht a small stable scab. No discharge





- Physician


Discussed Patient with Dr.: Other (Staci)





Neurosurgery Physical Exam





- Vitals, I&O, Labs





 I and O











 02/24/19 02/25/19 02/26/19





 05:59 05:59 05:59


 


Intake Total 650 1350 


 


Output Total 300  


 


Balance 350 1350 


 


Intake:   


 


  Oral (ml) 250 1350 


 


  IV Intake (ml) 400  


 


Output:   


 


  Urine (ml) 300  


 


    Toilet 300  


 


  Estimated Blood Loss (ml) 0  


 


Other:   


 


  Intake Quantity Yes Yes 





  Sufficient   


 


  Number of Voids   


 


    Toilet 1 1 


 


  Number of Stools   


 


    Toilet 1  








 Microbiology











 02/23/19 17:00 Mycobacterial Smear (SUMAN) - Final





 Vertebral Disc 


 


 02/23/19 17:00 Mycobacterial Smear (SUMAN) - Final





 Other - Aspirate 


 


 02/23/19 17:00 Gram Stain - Final





 Back - Aspirate 


 


 02/23/19 17:00 Gram Stain - Final





 Back - Aspirate 








 Vital Signs











Temp Pulse Resp BP Pulse Ox


 


 36.8 C   70   16   110/66   91 L


 


 02/25/19 07:47  02/25/19 07:47  02/25/19 07:47  02/25/19 07:47  02/25/19 07:47








 Laboratory Results





 02/25/19 04:13 





 02/25/19 04:13 











ICD10 Worksheet


Patient Problems: 


 Problems











Problem Status Onset


 


Discitis Acute  


 


Phlegmon Acute

## 2019-02-25 NOTE — ASMTCMCOM
CM Note

 

CM Note                       

Notes:

Denise from [facility] on site to meet with patient and .  Patient is covered at 100% for home 

antibiotics.  PT has evaluated and cleared for home/outpatient therapy.  [facility] will be able to 

staff with a RN.  CM will continue to follow for further needs.



Plan:  [facility] RN 

 

Date Signed:  02/25/2019 03:17 PM

Electronically Signed By:Annabella Key RN

## 2019-02-25 NOTE — PCMIDPN
Assessment/Plan: 


Assessment:  33-year-old woman with multilevel vertebral osteomyelitis with 

intervening diskitis presenting approximately 6 weeks after dural repair 

subsequent to her index operation November 2018 diskectomy. Repeat aspiration 

culture negative by gram stain and culture thus far and no growth from the 

initial aspirate showing gram negative rods in gram stain. Improved pain today 

and tolerating ceftriaxone without side effects. 





1. L4/L5, L5/S1 osteomyelitis with diskitis; Microbiology undefined to date 


2. Iliopsoas inflammation 


3. Status post L4-S1 diskectomy November 2018, performed in [place]


4. Status post dural leak correction 12/27/2018 





Plan:


1. Continue ceftriaxone 2gm daily


2. Reviewed in detail potential side effects of beta-lactam antibiotics to 

include: allergy, rash, nausea, antibiotic-associated diarrhea, Clostridioides 

difficile colitis.


3. Multiplex PCR testing on retained samples sent to the [facility] for bacterial and mycobacterial PCRs


4. T Spot TB testing ordered for Tuesday


5. Antibiotic history: ceftriaxone (2.22- ); cefepime (2.19-2.22); Vancomycin (

2.18-2.20)





Nixon Keenan MD


Infectious Diseases





02/25/19 16:09





Subjective: 


No fever or chills. Denies diarrhea, nausea, rash. Appetite improving. 

Ambulating without difficulty three times today. No new concerns today. 





Objective: 


 Vital Signs











Temp Pulse Resp BP Pulse Ox


 


 36.7 C   66   16   113/65   91 L


 


 02/25/19 15:53  02/25/19 15:53  02/25/19 15:53  02/25/19 15:53  02/25/19 15:53








 Microbiology











 02/23/19 17:00 Gram Stain - Final





 Back - Aspirate 


 


 02/23/19 17:00 Gram Stain - Final





 Back - Aspirate 


 


 02/23/19 07:30 Urine Culture - Final





 Urine,Clean Catch 


 


 02/23/19 17:00 Mycobacterial Smear (SUMAN) - Final





 Vertebral Disc 


 


 02/23/19 17:00 Mycobacterial Smear (SUMAN) - Final





 Other - Aspirate 








 Laboratory Results





 02/25/19 04:13 





 02/25/19 04:13 





 











 02/24/19 02/25/19 02/26/19





 05:59 05:59 05:59


 


Intake Total 650 1350 580


 


Output Total 300  


 


Balance 350 1350 580








 











ESR  20 MM/HR (0-20)   02/18/19  12:45    


 


C-Reactive Protein  26.3 mg/L (<10.0)  H  02/25/19  04:13    








Microbiology





02/23/19 17:00   Vertebral Disc   Mycobacterial Smear (SUMAN) - Final


02/23/19 17:00   Other - Aspirate   Mycobacterial Smear (SUMAN) - Final


02/23/19 17:00   Back - Aspirate   Gram Stain - Final


02/23/19 17:00   Back - Aspirate   Gram Stain - Final


02/23/19 08:04   Vertebral Disc   Gram Stain - Final


02/23/19 08:04   Vertebral Disc   Anaerobic Culture - Final


02/23/19 17:00   Vertebral Disc Space   Fungal Culture - Preliminary


02/23/19 17:00   Other - Aspirate   Fungal Culture - Preliminary


02/23/19 17:00   Back - Aspirate   Anaerobic Culture - Preliminary


02/23/19 17:00   Back - Aspirate   Anaerobic Culture - Preliminary





 Laboratory Tests











  02/18/19 02/18/19 02/19/19





  12:45 12:45 04:35


 


WBC    10.10 H


 


Hgb    10.7 L


 


ESR   20 


 


Creatinine   


 


C-Reactive Protein  16.2 H  














  02/23/19 02/23/19 02/24/19





  05:12 05:12 03:20


 


WBC    6.98


 


Hgb  10.5 L   10.4 L


 


ESR   


 


Creatinine   0.5 L 


 


C-Reactive Protein   66.6 H 














  02/25/19 02/25/19





  04:13 04:13


 


WBC  5.80 


 


Hgb  9.9 L 


 


ESR  


 


Creatinine   0.5 L


 


C-Reactive Protein   26.3 H














- Physical Exam


General Appearance: no apparent distress, non-toxic


EENT: No scleral icterus


Respiratory: No accessory muscle use


Neck: full range of motion


Extremities: No erythema


Skin: No erythema


Neuro/Psych: alert, normal mood/affect, oriented x 3, No confused





- Time Spent With Patient


Time Spent with Patient: greater than 25 minutes


Time Spent with Patient: Greater than 25 minutes spent on this patients care, 

greater than 50% of time spent counseling, educating, and coordinating care 

regarding the above mentioned plan.





ICD10 Worksheet


Patient Problems: 


 Problems











Problem Status Onset


 


Discitis Acute  


 


Phlegmon Acute

## 2019-02-25 NOTE — HOSPPROG
Hospitalist Progress Note


Assessment/Plan: 





32yo F with history of L4-5/L5-S1 herniation s/p discectomy (11/27/2018 in 

[place]) complicated by dural leak s/p patch (12/27/2018 at [facility]) presents 

with acute onset back pain. MRI shows discitis/osteomyelitis and cellulitis. 





#L4-5/L5-S1 discitis/osteomyelitis and phlegmon: Rpt MRI 2/23 with persistent 

phlegmon / abscess. S/p aspiration by IR on 2/18 and again 2/23, gram stain 

with rare gnr's.  Discussed with Dr. Mauricio.  PICC placed 2/22.  wbc's 

normalized, CRP trending back down.


   - s/p IR drainage of paraspinal abscess, Cx's ngtd


   - cont ceftriaxone day 4.  S/P 3 days cefepime.  Will discuss DOT with ID, 

suspect she'll need at least 6 weeks total.


   - Neurosurgery following, no surgical intervention planned unless she 

develops neurodeficits





#Left hip pain/back spasms: MRI shows psoas and neuroforaminal cellulitis at L4-

S1.  Pain better controlled last night with Toradol.


   - atbx as above


   - cont robaxin, Gabapentin


   - cont scheduled tylenol, resumed toradol for anti-inflammatory effect as 

this has been helpful


   - Continue prn PO and IV dilaudid, valium (d/c'd ativan)





#Anemia: - Normocytic. Likely multifactorial from iron deficiency and 

inflammation.


   - follow





VTE ppx: LMWH


Code: full


Diet: regular


Dispo: cont inpt, may be ready for dc soon, still awaiting Cx data and working 

on pain control.  CM consult to help with probably home infusion for IV atbx.


Subjective: Pt feels better today.  Had a better night, toradol helped with 

pain.  Less spasms.  No fevers.  Taking po ok.   at bedside, he is 

worried about bringing her home.


Objective: 


 Vital Signs











Temp Pulse Resp BP Pulse Ox


 


 36.8 C   70   16   110/66   91 L


 


 02/25/19 07:47  02/25/19 07:47  02/25/19 07:47  02/25/19 07:47  02/25/19 07:47








 Microbiology











 02/23/19 07:30 Urine Culture - Final





 Urine,Clean Catch 


 


 02/23/19 17:00 Mycobacterial Smear (SUMAN) - Final





 Vertebral Disc 


 


 02/23/19 17:00 Mycobacterial Smear (SUMAN) - Final





 Other - Aspirate 


 


 02/23/19 17:00 Gram Stain - Final





 Back - Aspirate 


 


 02/23/19 17:00 Gram Stain - Final





 Back - Aspirate 








 Laboratory Results





 02/25/19 04:13 





 02/25/19 04:13 





 











 02/24/19 02/25/19 02/26/19





 05:59 05:59 05:59


 


Intake Total 650 1350 


 


Output Total 300  


 


Balance 350 1350 








 











PT  13.7 SEC (12.0-15.0)   02/23/19  13:30    


 


INR  1.03  (0.83-1.16)   02/23/19  13:30    














- Physical Exam


Constitutional: no apparent distress


Eyes: PERRL


Ears, Nose, Mouth, Throat: moist mucous membranes


Cardiovascular: regular rate and rhythym


Respiratory: no respiratory distress


Gastrointestinal: normoactive bowel sounds, soft, non-tender abdomen


Skin: warm


Musculoskeletal: full muscle strength


Neurologic: AAOx3


Psychiatric: interacting appropriately





ICD10 Worksheet


Patient Problems: 


 Problems











Problem Status Onset


 


Discitis Acute  


 


Phlegmon Acute

## 2019-02-26 VITALS — DIASTOLIC BLOOD PRESSURE: 74 MMHG | SYSTOLIC BLOOD PRESSURE: 110 MMHG

## 2019-02-26 RX ADMIN — GABAPENTIN SCH MG: 100 CAPSULE ORAL at 08:27

## 2019-02-26 RX ADMIN — DOCUSATE SODIUM AND SENNOSIDES SCH: 50; 8.6 TABLET ORAL at 08:47

## 2019-02-26 RX ADMIN — ACETAMINOPHEN SCH MG: 500 TABLET ORAL at 11:51

## 2019-02-26 RX ADMIN — METHOCARBAMOL SCH MG: 500 TABLET ORAL at 05:36

## 2019-02-26 RX ADMIN — METHOCARBAMOL SCH MG: 500 TABLET ORAL at 11:51

## 2019-02-26 RX ADMIN — ACETAMINOPHEN SCH MG: 500 TABLET ORAL at 04:03

## 2019-02-26 NOTE — PDIAF
- Diagnosis


Diagnosis: Lumbar diskitis/osteomyelitis


Code Status: Full Code





- Medication Management


Long Term Antibiotics: Ceftriaxone 2 g IV Q 24 hr


Long Term Antibiotic Stop Date: 04/16/19


Discharge Medications: electronically signed and located in the Home Medication 

List.


PICC Care - Routine: Yes





- Orders


Services needed: Home Care, Registered Nurse


Home Care Face to Face: I certify that this patient was under my care and that 

I had the required face-to-face encounter meeting the encounter requirements on 

the discharge day.  My findings support the fact that the patient is homebound 

as defined in


Home Care Face to Face Continued: CMS Chapter 7 Medicare Benefits Manual 30.1.1

, The condition of the patient is such that there exists a normal inability to 

leave home and consequently, leaving home would require a considerable and 

taxing effort.


Diet Recommendation: no restrictions on diet


Diet Texture: Regular Texture Diet


Additional Instructions: 


scripts for gabapentin and robaxin were sent to the pharmacy here, [facility]


Robaxin has been scheduled 4 x day, take it as needed


continue scheduled Tylenol 1000 mg tid


use ibuprofen as needed for pain


f/u with Dr Small in 1 month


take it easy for next month , no bending twisting, turning





- Labs/Radiology


CBC w/diff Date: 02/28/19 (Weekly Q Thursday)


CMP Date: 02/28/19 (Weekly Q Thursday)


CRP Date: 02/28/19 (Weekly Q Thursday)


Call or Fax Lab and Imaging Results to: Dr. Barr, [phone]





- Follow Up Care


Current Providers and Referrals: 


Patient,NotPresent [Unknown] - As per Instructions


Donis Small MD [Medical Doctor] - 


Dary Barr MD [Medical Doctor] - 03/07/19 11:00 am

## 2019-02-26 NOTE — PDIAF
- Diagnosis


Diagnosis: Lumbar diskitis/osteomyelitis


Code Status: Full Code





- Medication Management


Long Term Antibiotics: Ceftriaxone 2 g IV Q 24 hr


Long Term Antibiotic Stop Date: 04/16/19


Discharge Medications: electronically signed and located in the Home Medication 

List.


PICC Care - Routine: Yes





- Orders


Services needed: Home Care


Home Care Face to Face: I certify that this patient was under my care and that 

I had the required face-to-face encounter meeting the encounter requirements on 

the discharge day.  My findings support the fact that the patient is homebound 

as defined in


Home Care Face to Face Continued: CMS Chapter 7 Medicare Benefits Manual 30.1.1

, The condition of the patient is such that there exists a normal inability to 

leave home and consequently, leaving home would require a considerable and 

taxing effort.





- Labs/Radiology


CBC w/diff Date: 02/28/19 (Weekly Q Thursday)


CMP Date: 02/28/19 (Weekly Q Thursday)


CRP Date: 02/28/19 (Weekly Q Thursday)





- Follow Up Care


Current Providers and Referrals: 


Patient,NotPresent [Unknown] - As per Instructions

## 2019-02-26 NOTE — HOSPPROG
Hospitalist Progress Note


Assessment/Plan: 








32yo F with history of L4-5/L5-S1 herniation s/p discectomy (11/27/2018 in 

[place]) complicated by dural leak s/p patch (12/27/2018 at [facility]) presents 

with acute onset back pain. MRI shows discitis/osteomyelitis and cellulitis. 

First encounter, chart reviewed.





#L4-5/L5-S1 discitis/osteomyelitis and phlegmon


- Rpt MRI 2/23 with persistent phlegmon / abscess.


- S/p aspiration by IR on 2/18 and again 2/23, gram stain with rare gnr's.  


- PICC placed 2/22. 


- s/p IR drainage of paraspinal abscess, Cx's ngtd


- cont ceftriaxone day 5.  S/P 3 days cefepime.  


- Neurosurgery following, no surgical intervention planned unless she develops 

neuro deficits





#Left hip pain/back spasms: MRI shows psoas and neuroforaminal cellulitis at L4-

S1.  


-better today


-will dc on robaxin and gabapentin, cont scheduled tylenol


   


#Anemia


-due to the acute illness


-will be monitored while on IV abx infusion 





#plan: dc home, she and her  are ready or her to be dc and will f/u with 

Dr Lopes and w Dr Small.


Subjective: Bambi said her pain is well managed, has no complaints.


Objective: 


 Vital Signs











Temp Pulse Resp BP Pulse Ox


 


 36.4 C   61   14   110/74   92 


 


 02/26/19 08:00  02/26/19 08:00  02/26/19 08:00  02/26/19 08:00  02/26/19 08:00








 Microbiology











 02/23/19 17:00 Mycobacterial Smear (SUMAN) - Final





 Other - Aspirate 


 


 02/23/19 17:00 Mycobacterial Smear (SUMAN) - Final





 Vertebral Disc 


 


 02/23/19 17:00 Gram Stain - Final





 Back - Aspirate 


 


 02/23/19 17:00 Gram Stain - Final





 Back - Aspirate 


 


 02/23/19 07:30 Urine Culture - Final





 Urine,Clean Catch 








 Laboratory Results





 02/25/19 04:13 





 02/25/19 04:13 





 











 02/25/19 02/26/19 02/27/19





 05:59 05:59 05:59


 


Intake Total 1350 580 


 


Balance 1350 580 








 











PT  13.7 SEC (12.0-15.0)   02/23/19  13:30    


 


INR  1.03  (0.83-1.16)   02/23/19  13:30    














- Physical Exam


Constitutional: no apparent distress, appears nourished, not in pain


Eyes: PERRL


Ears, Nose, Mouth, Throat: hearing normal


Cardiovascular: regular rate and rhythym


Respiratory: no respiratory distress


Skin: warm, other (two small incisions on her low back , no drainage, has a 

small scab)


Neurologic: AAOx3


Psychiatric: interacting appropriately





ICD10 Worksheet


Patient Problems: 


 Problems











Problem Status Onset


 


Discitis Acute  


 


Phlegmon Acute

## 2019-02-26 NOTE — GDS
[f 
rep st]



                                                             DISCHARGE SUMMARY





DISCHARGE DIAGNOSES:  

1.  L4-L5/L5-S1 diskitis/osteomyelitis and phlegmon.

2.  Left hip pain with associated back spasms.

3.  Anemia.



CONSULTATION:  

1.  Dr. Ariel Campbell with Neurosurgery.

2.  Dr. Eran Mauricio with Infectious Disease.



HISTORY:  Briefly, the patient is a very nice 33-year-old woman with a history 
of L4-L5/L5-S1 herniation, status post diskectomy on November 27, 2018, in the 
[facility].  This was complicated by a dural leak patch which was evaluated 
and treated at [facility] on December 27th.  She presented to the emergency 
room with acute onset back pain.  She notes that she had some mild low back 
pain 2 weeks prior while exercising and working with her physical therapy.  She 
reported 1-2 days of chills without fevers.  In the emergency room, a lumbar 
MRI was concerning for diskitis and osteomyelitis with possible phlegmon at L4-
L5/L5-S1.  She was seen and evaluated by Neurosurgery as well as the Infectious 
Disease team.  She has had multiple procedures including aspiration by IR on 
February 02/18 and again on 02/23.  She also had IR drainage of a paraspinal 
abscess.  She is being treated with ceftriaxone.  The plan is for her to be 
discharged home on IV antibiotics with close followup with Neurosurgery as well 
as Infectious Disease.



HOSPITAL COURSE:  

1.  L4-L5/L5-S1 diskitis, osteomyelitis, and phlegmon.  She is status post 
aspiration on 02/18 and on 02/23.  She also had an aspiration of her paraspinal 
abscess.  She is feeling overall well.  Her pain is well managed with Robaxin 
and gabapentin.  She has a PICC line in place that was placed on February 22nd.
  Will continue IV antibiotics per Infectious Disease.

2.  Left hip pain and back spasm.  Her MRI showed psoas and neuroforaminal 
cellulitis at L4-S1.  It is much better today.  I recommend she continue 
scheduled Tylenol and Robaxin as well as gabapentin.

3.  Anemia.  This is due to her acute illness.  Labs will be monitored weekly 
while she is getting IV antibiotics.



DISCHARGE CONDITION:  Stable.  Blood pressure is 110/74, heart rate is 61, 
respiratory rate is 14, O2 sats on room air 92%, temperature is 36.4 Celsius.



MEDICATIONS AT DISCHARGE:  Please see the EMR.



DISCHARGE INSTRUCTIONS:  

1.  Follow up with Neurology.

2.  No heavy lifting, twisting, or turning.

3.  Continue scheduled Tylenol.

4.  Follow up with Dr. Small in 1 month.

5.  Follow up with Dr. Lopes.

6.  She will get weekly labs to monitor her CBC, CMP, and CRP. 

Greater than 30 minutes spent discharging and coordinating her care.





Job #:  [id number]

MTDD

## 2019-02-26 NOTE — ASMTCMCOM
CM Note

 

CM Note                       

Notes:

Pts case discussed w/ Yareli Humphreys NP and Dr. Lopes. Pt is being d/c'd today. CM notified 

[facility] of the d/c. DC orders sent to [facility] along w/ picc line report. CM available for changes.







Plan: MARTIN Ordonez

 

Date Signed:  02/26/2019 01:55 PM

Electronically Signed By:ABI Romero

## 2019-02-26 NOTE — PDIAF
- Diagnosis


Diagnosis: Lumbar diskitis/osteomyelitis


Code Status: Full Code





- Medication Management


Long Term Antibiotics: Ceftriaxone 2 g IV Q 24 hr


Long Term Antibiotic Stop Date: 04/16/19


Discharge Medications: electronically signed and located in the Home Medication 

List.


PICC Care - Routine: Yes





- Orders


Services needed: Home Care, Registered Nurse


Home Care Face to Face: I certify that this patient was under my care and that 

I had the required face-to-face encounter meeting the encounter requirements on 

the discharge day.  My findings support the fact that the patient is homebound 

as defined in


Home Care Face to Face Continued: CMS Chapter 7 Medicare Benefits Manual 30.1.1

, The condition of the patient is such that there exists a normal inability to 

leave home and consequently, leaving home would require a considerable and 

taxing effort.


Diet Recommendation: no restrictions on diet


Diet Texture: Regular Texture Diet


Additional Instructions: 


scripts for gabapentin and robaxin were sent to the pharmacy here, [facility]


Robaxin has been scheduled 4 x day, take it as needed


continue scheduled Tylenol 1000 mg tid


use ibuprofen as needed for pain


f/u with Dr Small in 1 month


take it easy for next month , no bending twisting, turning





- Labs/Radiology


CBC w/diff Date: 02/28/19 (Weekly Q Thursday)


CMP Date: 02/28/19 (Weekly Q Thursday)


CRP Date: 02/28/19 (Weekly Q Thursday)





- Follow Up Care


Current Providers and Referrals: 


Patient,NotPresent [Unknown] - As per Instructions


Donis Small MD [Medical Doctor] -

## 2019-02-26 NOTE — ASMTLACE
LACE

 

Length of stay for            Answers:  7-13 days                             

current admission                                                             

Acuity / Level of             Answers:  Yes                                   

Care: Did the patient                                                         

have an inpatient                                                             

admission?                                                                    

# of Emergency department     Answers:  1-2                                   

visits in the last 6                                                          

months                                                                        

Score: 9

 

Date Signed:  02/26/2019 01:56 PM

Electronically Signed By:ABI Romero

## 2019-02-26 NOTE — NEUSURGPN
Assessment/Plan: 





Assessment: 33 yr old s/p L4-5, L5-S1 [facility]ami in [place], then dural repair on 12/27 /18





Patient stable this morning





Plan:


-Lumbar MRI (2/22) demonstrated with increase paraspinal fluid collection, 

aspiration yesterday of the disc space and posterior fluid collection. Patient 

state her pain is improved since the procedure


-ID following, appreciate there management


-DVT ppx SCDs, TEDs. Ok for Lovenox 


-No surgical intervention indicated at this time


-Seen by Dr. Small and myself


-Follow up in 1 month in clinic with Dr. Small


-Dispo planning per medicine


-please call neurosurgery with any questions/concerns 





 


Subjective: 


low abck and left hip pain improved. 


Objective: 


 


NAD A&Ox3 


MAEx4 5/5 and equal in BUE and BLE


Incision c/d/i wiht a small stable scab. No discharge





- Physician


Discussed Patient with Dr.: Other (Staci)





Neurosurgery Physical Exam





- Vitals, I&O, Labs





 I and O











 02/25/19 02/26/19 02/27/19





 05:59 05:59 05:59


 


Intake Total 1350 580 


 


Balance 1350 580 


 


Intake:   


 


  Oral (ml) 1350 580 


 


Other:   


 


  Intake Quantity Yes Yes 





  Sufficient   


 


  Number of Voids   


 


    Toilet 1 4 








 Microbiology











 02/23/19 17:00 Gram Stain - Final





 Back - Aspirate 


 


 02/23/19 17:00 Gram Stain - Final





 Back - Aspirate 


 


 02/23/19 07:30 Urine Culture - Final





 Urine,Clean Catch 








 Vital Signs











Temp Pulse Resp BP Pulse Ox


 


 36.4 C   61   14   110/74   92 


 


 02/26/19 08:00  02/26/19 08:00  02/26/19 08:00  02/26/19 08:00  02/26/19 08:00








 Laboratory Results





 02/25/19 04:13 





 02/25/19 04:13 











ICD10 Worksheet


Patient Problems: 


 Problems











Problem Status Onset


 


Discitis Acute  


 


Phlegmon Acute

## 2019-02-26 NOTE — PCMIDPN
Assessment/Plan: 


Assessment/Plan:


* L4-5/L5-S1 diskitis/osteomyelitis with epidural phlegmon:  Clinically 

improved with ceftriaxone therapy.  Feels like she can now manage activities of 

daily living at home.  Cultures from both disc aspirations remain no growth to 

date.  Have added on fungal PCR to multiplex PCR which is pending at [facility] with results likely not available until end of this week.  Will 

ask pathology if AFB and fungal stains can be done on initial bone specimen 

here.  Given improvement with ceftriaxone, will continue with ceftriaxone 

pending additional data as available.  Reviewed with patient alternative of 

using oral fluoroquinolone and she currently favors continued IV ceftriaxone 

since she is feeling improved.  Side effects of ceftriaxone including allergic 

reactions, skin rash, drug fever, biliary sludging, and potential for changes 

in blood count/liver tests/kidney test discussed with patient today.  Risks and 

benefits of PICC line including potential for PICC associated DVT or infection 

discussed with patient today.  Will have patient follow-up in our office next 

week with continued modification of treatment course based on culture and PCR 

findings as available.


* Left hip pain:  Markedly improved and likely related to left psoas 

inflammatory process; improvement encouraging for decreasing inflammation over 

time in response to antibiotic therapy.





Time spent, greater than 35 min, which greater than half was spent in education/

counseling/coordination of care related to L4-5/L5-S1 diskitis and 

osteomyelitis as well as ongoing plan of care including outpatient IV 

ceftriaxone.  Care was coordinated with Yareli Humphreys, [facility], case management 

and microbiology lab.





02/26/19 12:04





Subjective: 





Patient feels clinically improved with less back pain and left hip pain.  Still 

using walker to ambulate but able to do so more easily.  Not requiring IV 

medications for pain management.  Patient feels like she is at the point where 

she can continue care at home.


Objective: 


 Vital Signs











Temp Pulse Resp BP Pulse Ox


 


 36.4 C   61   14   110/74   92 


 


 02/26/19 08:00  02/26/19 08:00  02/26/19 08:00  02/26/19 08:00  02/26/19 08:00








 Microbiology











 02/23/19 17:00 Mycobacterial Smear (SUMAN) - Final





 Other - Aspirate 


 


 02/23/19 17:00 Mycobacterial Smear (SUMAN) - Final





 Vertebral Disc 


 


 02/23/19 17:00 Gram Stain - Final





 Back - Aspirate 


 


 02/23/19 17:00 Gram Stain - Final





 Back - Aspirate 


 


 02/23/19 07:30 Urine Culture - Final





 Urine,Clean Catch 








 Laboratory Results





 02/25/19 04:13 





 02/25/19 04:13 





 











 02/25/19 02/26/19 02/27/19





 05:59 05:59 05:59


 


Intake Total 1350 580 


 


Balance 1350 580 








 











ESR  20 MM/HR (0-20)   02/18/19  12:45    


 


C-Reactive Protein  26.3 mg/L (<10.0)  H  02/25/19  04:13    








Ceftriaxone # 4


Cultures from initial and subsequent aspiration all reviewed with cultures 

being no growth to date


Multiplex PCR pending at [facility] from initial back specimen


T spot pending


 Laboratory Tests











  02/20/19 02/26/19





  17:00 04:16


 


Brucella IgG Antibody  Negative 


 


Brucella IgM Antibody  Negative 


 


TB Test (QFT)   Pending














- Physical Exam


General Appearance: alert, no apparent distress, non-toxic


EENT: No scleral icterus, No thrush, No conjunctival petechiae


Respiratory: lungs clear, No respiratory distress


Cardiac/Chest: regular rate, rhythm, No systolic murmur


Extremities: other (Significantly improved range of motion of left hip with 

decreased tenderness)


Abdomen: non-tender, No distended


Back: other (No tenderness at site of previous surgical incision; small scab 

remains present over surgical incision)


Neuro/Psych: No motor weakness





ICD10 Worksheet


Patient Problems: 


 Problems











Problem Status Onset


 


Discitis Acute  


 


Phlegmon Acute

## 2019-02-26 NOTE — ASDISCHSUM
----------------------------------------------

Discharge Information

----------------------------------------------

Plan Status:IV ABX/Infusion                          Medically Cleared to Leave:02/25/2019

Discharge Date:02/25/2019                            CM D/C Disposition:

ADT D/C Disposition:Home Health Service              Projected Discharge Date:02/26/2019 11:00 AM

Transportation at D/C:                               Discharge Delay Reason:

Follow-Up Date:02/26/2019 11:00 AM                   Discharge Slot:

Final Diagnosis:

----------------------------------------------

Placement Information

----------------------------------------------

Referral Type:Home Infusion                          Referral ID:[id number]

Provider Name:[facility] [place]

Address 1:[address]                Phone [phone]

Address 2:                                           Fax [phone]

City:[place]                                      Selection Factors:

State:[place]

 

----------------------------------------------

Patient Contact Information

----------------------------------------------

Contact Name:GLENDY                        Relationship:

Address:[address] [address]                   Home [phone]

                                                     Work Phone:

[facility]:[place]                                      Alternate Phone:

[facility]/Zip Code:[place] [address]                              Email:

----------------------------------------------

Financial Information

----------------------------------------------

Financial Class:HMO and PPO Plans

Primary Plan Desc:[employer] PPO POS HMO SIG ADM          Primary Plan Number:[id number]

Secondary Plan Desc:                                 Secondary Plan Number:

 

 

----------------------------------------------

Assessment Information

----------------------------------------------

----------------------------------------------

LACE

----------------------------------------------

LACE

 

Length of stay for            Answers:  7-13 days                             

current admission                                                             

Acuity / Level of             Answers:  Yes                                   

Care: Did the patient                                                         

have an inpatient                                                             

admission?                                                                    

# of Emergency department     Answers:  1-2                                   

visits in the last 6                                                          

months                                                                        

Score: 9

 

Date Signed:  02/26/2019 01:56 PM

Electronically Signed By:ABI Romero

 

 

----------------------------------------------

[facility] CM Progress Note

----------------------------------------------

CM Note

 

CM Note                       

Notes:

Pt in with spinal epidural abscess vs. diskitis, she had spinal surgery in [place] in November 2018. Pt 

recently moved to [place] and resides with . PT rec home/outpatient today. Pt on IV antibiotics. 



CM to follow pt progress for d/c planning. 

 

Date Signed:  02/19/2019 04:39 PM

Electronically Signed By:ERNIE Garrett

 

 

----------------------------------------------

[facility] CM Progress Note

----------------------------------------------

CM Note

 

CM Note                       

Notes:

Reviewed chart, pt admitted for back pain, and infection from previous back surgery. She is 

otherwise independent and PT has cleared her for home. She will dc home w/support of  when 

medically stable, CM available for any changes.



DC Plan: Home/ outpt PT

 

Date Signed:  02/22/2019 10:40 AM

Electronically Signed By:Rosa Uriatre RN

 

 

----------------------------------------------

[facility] CM Progress Note

----------------------------------------------

CM Note

 

CM Note                       

Notes:

Spoke w/MD, pt will need IV abx at home but will not dc before Monday. CM sent referral to 

[facility] will arrange nursing.



DC Plan: Home Infusion/ [facility]



 

 

Date Signed:  02/22/2019 05:04 PM

Electronically Signed By:Rosa Uriarte RN

 

 

----------------------------------------------

[facility] CM Progress Note

----------------------------------------------

CM Note

 

CM Note                       

Notes:

Chart review: patient continuing IV antibiotics, discharge when pain adequately controlled. CM to 

follow. 



D/C Plan: home with [facility] for IV antibiotics. 

 

Date Signed:  02/24/2019 01:41 PM

Electronically Signed By:Gracie Ribera

 

 

----------------------------------------------

[facility] CM Progress Note

----------------------------------------------

CM Note

 

CM Note                       

Notes:

Denise from [facility] on site to meet with patient and .  Patient is covered at 100% for home 

antibiotics.  PT has evaluated and cleared for home/outpatient therapy.  [facility] will be able to 

staff with a RN.  CM will continue to follow for further needs.



Plan:  [facility] RN 

 

Date Signed:  02/25/2019 03:17 PM

Electronically Signed By:Annabella Key RN

 

 

----------------------------------------------

[facility] CM Progress Note

----------------------------------------------

CM Note

 

CM Note                       

Notes:

Pts case discussed w/ Yareli Humphreys NP and Dr. Lopes. Pt is being d/c'd today. CM notified 

[facility] of the d/c. DC orders sent to [facility] along w/ picc line report. CM available for changes.







Plan: [facility]

 

Date Signed:  02/26/2019 01:55 PM

Electronically Signed By:ABI Romero

 

 

----------------------------------------------

Intervention Information

----------------------------------------------

Intervention Type:*Incorrect Registration            Date of Service:02/19/2019 09:47 AM

Patient Type:Inpatient                               Staff Member:Clarice Wright

Hours:                                               Discipline:

Severity:                                            Comment:

## 2019-03-03 ENCOUNTER — HOSPITAL ENCOUNTER (EMERGENCY)
Dept: HOSPITAL 80 - FED | Age: 34
Discharge: HOME | End: 2019-03-03
Payer: COMMERCIAL